# Patient Record
Sex: FEMALE | Race: WHITE | Employment: UNEMPLOYED | URBAN - METROPOLITAN AREA
[De-identification: names, ages, dates, MRNs, and addresses within clinical notes are randomized per-mention and may not be internally consistent; named-entity substitution may affect disease eponyms.]

---

## 2017-02-13 ENCOUNTER — LAB ENCOUNTER (OUTPATIENT)
Dept: LAB | Facility: HOSPITAL | Age: 47
End: 2017-02-13
Attending: FAMILY MEDICINE
Payer: COMMERCIAL

## 2017-02-13 DIAGNOSIS — E03.9 HYPOTHYROIDISM: ICD-10-CM

## 2017-02-13 DIAGNOSIS — R53.83 FATIGUE: Primary | ICD-10-CM

## 2017-02-13 LAB
ANTI-THYROGLOBULIN: 130 U/ML (ref ?–60)
ANTI-THYROPEROXIDASE: 584 U/ML (ref ?–60)
FREE T4: 0.7 NG/DL (ref 0.9–1.8)
HSCRP: 7.14 MG/L (ref ?–3)
T3FREE SERPL-MCNC: 2.39 PG/ML (ref 2.3–4.2)
TSI SER-ACNC: 1.99 MIU/ML (ref 0.35–5.5)

## 2017-02-13 PROCEDURE — 84439 ASSAY OF FREE THYROXINE: CPT

## 2017-02-13 PROCEDURE — 84443 ASSAY THYROID STIM HORMONE: CPT

## 2017-02-13 PROCEDURE — 36415 COLL VENOUS BLD VENIPUNCTURE: CPT

## 2017-02-13 PROCEDURE — 86376 MICROSOMAL ANTIBODY EACH: CPT

## 2017-02-13 PROCEDURE — 84481 FREE ASSAY (FT-3): CPT

## 2017-02-13 PROCEDURE — 86800 THYROGLOBULIN ANTIBODY: CPT

## 2017-02-13 PROCEDURE — 86141 C-REACTIVE PROTEIN HS: CPT

## 2017-06-17 ENCOUNTER — LAB ENCOUNTER (OUTPATIENT)
Dept: LAB | Facility: HOSPITAL | Age: 47
End: 2017-06-17
Attending: FAMILY MEDICINE
Payer: COMMERCIAL

## 2017-06-17 DIAGNOSIS — R53.83 FATIGUE: ICD-10-CM

## 2017-06-17 DIAGNOSIS — R53.81 MALAISE: ICD-10-CM

## 2017-06-17 DIAGNOSIS — E03.9 HYPOTHYROIDISM: Primary | ICD-10-CM

## 2017-06-17 PROCEDURE — 84481 FREE ASSAY (FT-3): CPT

## 2017-06-17 PROCEDURE — 80061 LIPID PANEL: CPT

## 2017-06-17 PROCEDURE — 84443 ASSAY THYROID STIM HORMONE: CPT

## 2017-06-17 PROCEDURE — 84439 ASSAY OF FREE THYROXINE: CPT

## 2017-06-17 PROCEDURE — 36415 COLL VENOUS BLD VENIPUNCTURE: CPT

## 2017-07-24 RX ORDER — LEVOCETIRIZINE DIHYDROCHLORIDE 5 MG/1
TABLET, FILM COATED ORAL
Qty: 30 TABLET | Refills: 0 | OUTPATIENT
Start: 2017-07-24

## 2017-07-24 NOTE — TELEPHONE ENCOUNTER
Request for refill of Xyzal. LOV was 12/22/2016 and med last ordered at that time for one year of refills. Pt due to see Dr in December 2017. Called pt and she does not want a refill at this time it was sent by automatic means by pharmacy.  Pt states she wi

## 2017-07-26 ENCOUNTER — HOSPITAL ENCOUNTER (OUTPATIENT)
Dept: MRI IMAGING | Facility: HOSPITAL | Age: 47
Discharge: HOME | End: 2017-07-26
Attending: OPHTHALMOLOGY
Payer: COMMERCIAL

## 2017-07-26 DIAGNOSIS — D49.89 ORBITAL TUMOR: ICD-10-CM

## 2017-07-26 PROCEDURE — 70543 MRI ORBT/FAC/NCK W/O &W/DYE: CPT | Performed by: OPHTHALMOLOGY

## 2017-07-26 PROCEDURE — A9575 INJ GADOTERATE MEGLUMI 0.1ML: HCPCS | Performed by: OPHTHALMOLOGY

## 2017-07-26 PROCEDURE — 70553 MRI BRAIN STEM W/O & W/DYE: CPT | Performed by: OPHTHALMOLOGY

## 2017-08-29 ENCOUNTER — LAB ENCOUNTER (OUTPATIENT)
Dept: LAB | Facility: HOSPITAL | Age: 47
End: 2017-08-29
Attending: FAMILY MEDICINE
Payer: COMMERCIAL

## 2017-08-29 DIAGNOSIS — G93.49 HASHIMOTO ENCEPHALOPATHY: ICD-10-CM

## 2017-08-29 DIAGNOSIS — D64.9 ANEMIA, UNSPECIFIED TYPE: ICD-10-CM

## 2017-08-29 DIAGNOSIS — E06.3 HASHIMOTO ENCEPHALOPATHY: ICD-10-CM

## 2017-08-29 DIAGNOSIS — E03.9 HYPOTHYROIDISM: ICD-10-CM

## 2017-08-29 DIAGNOSIS — R53.83 FATIGUE: ICD-10-CM

## 2017-08-29 DIAGNOSIS — E34.9 ENDOCRINE DISORDER: Primary | ICD-10-CM

## 2017-08-29 DIAGNOSIS — R53.81 MALAISE: ICD-10-CM

## 2017-08-29 LAB
25-HYDROXYVITAMIN D (TOTAL): 90.8 NG/ML (ref 30–100)
ANTI-THYROGLOBULIN: 174 U/ML (ref ?–60)
ANTI-THYROPEROXIDASE: 1113 U/ML (ref ?–60)
DEPRECATED HBV CORE AB SER IA-ACNC: 49.2 NG/ML (ref 10–291)
FREE T4: 0.5 NG/DL (ref 0.9–1.8)
HAV AB SERPL IA-ACNC: 686 PG/ML (ref 193–986)
HSCRP: 9.75 MG/L (ref ?–3)
T3 SERPL-MCNC: 77 NG/DL (ref 60–181)
T3FREE SERPL-MCNC: 2.41 PG/ML (ref 2.3–4.2)
TSI SER-ACNC: 3.04 MIU/ML (ref 0.35–5.5)

## 2017-08-29 PROCEDURE — 84443 ASSAY THYROID STIM HORMONE: CPT

## 2017-08-29 PROCEDURE — 82607 VITAMIN B-12: CPT

## 2017-08-29 PROCEDURE — 84439 ASSAY OF FREE THYROXINE: CPT

## 2017-08-29 PROCEDURE — 86800 THYROGLOBULIN ANTIBODY: CPT

## 2017-08-29 PROCEDURE — 84482 T3 REVERSE: CPT

## 2017-08-29 PROCEDURE — 82728 ASSAY OF FERRITIN: CPT

## 2017-08-29 PROCEDURE — 84481 FREE ASSAY (FT-3): CPT

## 2017-08-29 PROCEDURE — 86376 MICROSOMAL ANTIBODY EACH: CPT

## 2017-08-29 PROCEDURE — 86141 C-REACTIVE PROTEIN HS: CPT

## 2017-08-29 PROCEDURE — 82306 VITAMIN D 25 HYDROXY: CPT

## 2017-08-29 PROCEDURE — 84480 ASSAY TRIIODOTHYRONINE (T3): CPT

## 2017-08-29 PROCEDURE — 36415 COLL VENOUS BLD VENIPUNCTURE: CPT

## 2017-08-31 LAB — TRIIODOTHYRONINE, REVERSE: 4.6 NG/DL

## 2017-10-24 ENCOUNTER — HOSPITAL ENCOUNTER (OUTPATIENT)
Dept: MAMMOGRAPHY | Age: 47
Discharge: HOME | End: 2017-10-24
Attending: OBSTETRICS & GYNECOLOGY
Payer: COMMERCIAL

## 2017-10-24 DIAGNOSIS — Z12.31 VISIT FOR SCREENING MAMMOGRAM: ICD-10-CM

## 2017-10-24 PROCEDURE — 77067 SCR MAMMO BI INCL CAD: CPT | Performed by: OBSTETRICS & GYNECOLOGY

## 2017-11-02 ENCOUNTER — LAB ENCOUNTER (OUTPATIENT)
Dept: LAB | Age: 47
End: 2017-11-02
Attending: FAMILY MEDICINE
Payer: COMMERCIAL

## 2017-11-02 DIAGNOSIS — E06.3 CHRONIC LYMPHOCYTIC THYROIDITIS: ICD-10-CM

## 2017-11-02 DIAGNOSIS — I51.9 MYXEDEMA HEART DISEASE: Primary | ICD-10-CM

## 2017-11-02 DIAGNOSIS — E03.9 MYXEDEMA HEART DISEASE: Primary | ICD-10-CM

## 2017-11-02 DIAGNOSIS — R53.83 FATIGUE: ICD-10-CM

## 2017-11-02 PROCEDURE — 86376 MICROSOMAL ANTIBODY EACH: CPT

## 2017-11-02 PROCEDURE — 84481 FREE ASSAY (FT-3): CPT

## 2017-11-02 PROCEDURE — 82306 VITAMIN D 25 HYDROXY: CPT

## 2017-11-02 PROCEDURE — 84443 ASSAY THYROID STIM HORMONE: CPT

## 2017-11-02 PROCEDURE — 36415 COLL VENOUS BLD VENIPUNCTURE: CPT

## 2017-11-02 PROCEDURE — 84439 ASSAY OF FREE THYROXINE: CPT

## 2018-03-21 ENCOUNTER — LAB ENCOUNTER (OUTPATIENT)
Dept: LAB | Age: 48
End: 2018-03-21
Attending: FAMILY MEDICINE
Payer: COMMERCIAL

## 2018-03-21 DIAGNOSIS — E55.9 VITAMIN D DEFICIENCY: ICD-10-CM

## 2018-03-21 DIAGNOSIS — R53.83 FATIGUE: ICD-10-CM

## 2018-03-21 DIAGNOSIS — E03.9 HYPOTHYROIDISM: Primary | ICD-10-CM

## 2018-03-21 DIAGNOSIS — Z51.81 ENCOUNTER FOR THERAPEUTIC DRUG MONITORING: ICD-10-CM

## 2018-03-21 DIAGNOSIS — E06.3 HASHIMOTO'S THYROIDITIS: ICD-10-CM

## 2018-03-21 LAB
25-HYDROXYVITAMIN D (TOTAL): 42.8 NG/ML (ref 30–100)
ANTI-THYROGLOBULIN: 250 U/ML (ref ?–60)
ANTI-THYROPEROXIDASE: 1141 U/ML (ref ?–60)
C-REACTIVE PROTEIN: 0.57 MG/DL (ref ?–1)
DEPRECATED HBV CORE AB SER IA-ACNC: 48.3 NG/ML (ref 10–291)
FREE T4: 0.6 NG/DL (ref 0.9–1.8)
HAV AB SERPL IA-ACNC: 593 PG/ML (ref 193–986)
T3FREE SERPL-MCNC: 2.85 PG/ML (ref 2.3–4.2)
TSI SER-ACNC: 1.09 MIU/ML (ref 0.35–5.5)

## 2018-03-21 PROCEDURE — 86376 MICROSOMAL ANTIBODY EACH: CPT

## 2018-03-21 PROCEDURE — 86800 THYROGLOBULIN ANTIBODY: CPT

## 2018-03-21 PROCEDURE — 84439 ASSAY OF FREE THYROXINE: CPT

## 2018-03-21 PROCEDURE — 82306 VITAMIN D 25 HYDROXY: CPT

## 2018-03-21 PROCEDURE — 84443 ASSAY THYROID STIM HORMONE: CPT

## 2018-03-21 PROCEDURE — 36415 COLL VENOUS BLD VENIPUNCTURE: CPT

## 2018-03-21 PROCEDURE — 84481 FREE ASSAY (FT-3): CPT

## 2018-03-21 PROCEDURE — 86140 C-REACTIVE PROTEIN: CPT

## 2018-03-21 PROCEDURE — 82607 VITAMIN B-12: CPT

## 2018-03-21 PROCEDURE — 82728 ASSAY OF FERRITIN: CPT

## 2018-08-01 ENCOUNTER — LAB ENCOUNTER (OUTPATIENT)
Dept: LAB | Facility: HOSPITAL | Age: 48
End: 2018-08-01
Attending: FAMILY MEDICINE
Payer: COMMERCIAL

## 2018-08-01 DIAGNOSIS — E55.9 AVITAMINOSIS D: ICD-10-CM

## 2018-08-01 DIAGNOSIS — Z13.220 SCREENING FOR LIPOID DISORDERS: ICD-10-CM

## 2018-08-01 DIAGNOSIS — Z79.899 NEED FOR PROPHYLACTIC CHEMOTHERAPY: ICD-10-CM

## 2018-08-01 DIAGNOSIS — Z51.81 ENCOUNTER FOR THERAPEUTIC DRUG MONITORING: ICD-10-CM

## 2018-08-01 DIAGNOSIS — R53.83 FATIGUE: ICD-10-CM

## 2018-08-01 DIAGNOSIS — E06.3 CHRONIC LYMPHOCYTIC THYROIDITIS: ICD-10-CM

## 2018-08-01 DIAGNOSIS — E03.9 MYXEDEMA HEART DISEASE: Primary | ICD-10-CM

## 2018-08-01 DIAGNOSIS — I51.9 MYXEDEMA HEART DISEASE: Primary | ICD-10-CM

## 2018-08-01 DIAGNOSIS — Z13.89 ENCOUNTER FOR ROUTINE SCREENING FOR MALFORMATION USING ULTRASONICS: ICD-10-CM

## 2018-08-01 LAB
ALBUMIN SERPL-MCNC: 4.3 G/DL (ref 3.5–4.8)
ALBUMIN/GLOB SERPL: 1.2 {RATIO} (ref 1–2)
ALP LIVER SERPL-CCNC: 71 U/L (ref 39–100)
ALT SERPL-CCNC: 26 U/L (ref 14–54)
ANION GAP SERPL CALC-SCNC: 5 MMOL/L (ref 0–18)
AST SERPL-CCNC: 17 U/L (ref 15–41)
BILIRUB SERPL-MCNC: 0.6 MG/DL (ref 0.1–2)
BUN BLD-MCNC: 19 MG/DL (ref 8–20)
BUN/CREAT SERPL: 21.1 (ref 10–20)
CALCIUM BLD-MCNC: 9.4 MG/DL (ref 8.3–10.3)
CHLORIDE SERPL-SCNC: 108 MMOL/L (ref 101–111)
CHOLEST SMN-MCNC: 183 MG/DL (ref ?–200)
CO2 SERPL-SCNC: 29 MMOL/L (ref 22–32)
CREAT BLD-MCNC: 0.9 MG/DL (ref 0.55–1.02)
DEPRECATED HBV CORE AB SER IA-ACNC: 45.1 NG/ML (ref 12–240)
GLOBULIN PLAS-MCNC: 3.7 G/DL (ref 2.5–3.7)
GLUCOSE BLD-MCNC: 95 MG/DL (ref 70–99)
HAV AB SERPL IA-ACNC: 753 PG/ML (ref 193–986)
HDLC SERPL-MCNC: 56 MG/DL (ref 40–59)
HSCRP: 3.43 MG/L (ref ?–3)
LDLC SERPL CALC-MCNC: 113 MG/DL (ref ?–100)
M PROTEIN MFR SERPL ELPH: 8 G/DL (ref 6.1–8.3)
NONHDLC SERPL-MCNC: 127 MG/DL (ref ?–130)
OSMOLALITY SERPL CALC.SUM OF ELEC: 296 MOSM/KG (ref 275–295)
POTASSIUM SERPL-SCNC: 4.2 MMOL/L (ref 3.6–5.1)
SODIUM SERPL-SCNC: 142 MMOL/L (ref 136–144)
T3FREE SERPL-MCNC: 3.15 PG/ML (ref 2.3–4.2)
T4 FREE SERPL-MCNC: 0.6 NG/DL (ref 0.9–1.8)
THYROGLOB SERPL-MCNC: 213 U/ML (ref ?–60)
THYROPEROXIDASE AB SERPL-ACNC: 1653 U/ML (ref ?–60)
TRIGL SERPL-MCNC: 68 MG/DL (ref 30–149)
TSI SER-ACNC: 0.62 MIU/ML (ref 0.35–5.5)
VIT D+METAB SERPL-MCNC: 73.9 NG/ML (ref 30–100)
VLDLC SERPL CALC-MCNC: 14 MG/DL (ref 0–30)

## 2018-08-01 PROCEDURE — 82728 ASSAY OF FERRITIN: CPT

## 2018-08-01 PROCEDURE — 36415 COLL VENOUS BLD VENIPUNCTURE: CPT

## 2018-08-01 PROCEDURE — 82607 VITAMIN B-12: CPT

## 2018-08-01 PROCEDURE — 82306 VITAMIN D 25 HYDROXY: CPT

## 2018-08-01 PROCEDURE — 86141 C-REACTIVE PROTEIN HS: CPT

## 2018-08-01 PROCEDURE — 86800 THYROGLOBULIN ANTIBODY: CPT

## 2018-08-01 PROCEDURE — 80061 LIPID PANEL: CPT

## 2018-08-01 PROCEDURE — 84439 ASSAY OF FREE THYROXINE: CPT

## 2018-08-01 PROCEDURE — 80053 COMPREHEN METABOLIC PANEL: CPT

## 2018-08-01 PROCEDURE — 84443 ASSAY THYROID STIM HORMONE: CPT

## 2018-08-01 PROCEDURE — 86376 MICROSOMAL ANTIBODY EACH: CPT

## 2018-08-01 PROCEDURE — 84481 FREE ASSAY (FT-3): CPT

## 2018-10-10 ENCOUNTER — HOSPITAL ENCOUNTER (OUTPATIENT)
Dept: MAMMOGRAPHY | Facility: HOSPITAL | Age: 48
Discharge: HOME | End: 2018-10-10
Attending: NURSE PRACTITIONER
Payer: COMMERCIAL

## 2018-10-10 DIAGNOSIS — Z12.39 SCREENING FOR BREAST CANCER: ICD-10-CM

## 2018-10-10 PROCEDURE — 77067 SCR MAMMO BI INCL CAD: CPT | Performed by: NURSE PRACTITIONER

## 2018-10-10 PROCEDURE — 77063 BREAST TOMOSYNTHESIS BI: CPT | Performed by: NURSE PRACTITIONER

## 2018-10-12 PROBLEM — Z98.890 S/P ENDOMETRIAL ABLATION: Status: ACTIVE | Noted: 2018-10-12

## 2018-10-12 PROBLEM — R87.612 LOW GRADE SQUAMOUS INTRAEPITHELIAL LESION ON CYTOLOGIC SMEAR OF CERVIX (LGSIL): Status: ACTIVE | Noted: 2018-10-12

## 2018-10-15 ENCOUNTER — CHARTING TRANS (OUTPATIENT)
Dept: OTHER | Age: 48
End: 2018-10-15

## 2018-10-16 PROBLEM — N92.0 MENORRHAGIA WITH REGULAR CYCLE: Status: ACTIVE | Noted: 2018-10-16

## 2018-10-17 ENCOUNTER — CHARTING TRANS (OUTPATIENT)
Dept: OTHER | Age: 48
End: 2018-10-17

## 2018-10-22 ENCOUNTER — CHARTING TRANS (OUTPATIENT)
Dept: OTHER | Age: 48
End: 2018-10-22

## 2018-10-26 ENCOUNTER — CHARTING TRANS (OUTPATIENT)
Dept: OTHER | Age: 48
End: 2018-10-26

## 2018-10-29 ENCOUNTER — LAB SERVICES (OUTPATIENT)
Dept: OTHER | Age: 48
End: 2018-10-29

## 2018-10-29 ENCOUNTER — CHARTING TRANS (OUTPATIENT)
Dept: OTHER | Age: 48
End: 2018-10-29

## 2018-10-29 LAB — SKIN TEST, TB IN-OFFICE: NEGATIVE

## 2018-10-29 PROCEDURE — 88305 TISSUE EXAM BY PATHOLOGIST: CPT | Performed by: OBSTETRICS & GYNECOLOGY

## 2018-12-04 ENCOUNTER — LAB ENCOUNTER (OUTPATIENT)
Dept: LAB | Facility: HOSPITAL | Age: 48
End: 2018-12-04
Attending: FAMILY MEDICINE
Payer: COMMERCIAL

## 2018-12-04 DIAGNOSIS — E03.9 HYPOTHYROIDISM: Primary | ICD-10-CM

## 2018-12-04 DIAGNOSIS — R53.83 FATIGUE: ICD-10-CM

## 2018-12-04 DIAGNOSIS — R79.82 ELEVATED C-REACTIVE PROTEIN (CRP): ICD-10-CM

## 2018-12-04 DIAGNOSIS — E78.00 HYPERCHOLESTEROLEMIA: ICD-10-CM

## 2018-12-04 DIAGNOSIS — E06.3 HASHIMOTO'S THYROIDITIS: ICD-10-CM

## 2018-12-04 DIAGNOSIS — D64.9 ANEMIA: ICD-10-CM

## 2018-12-04 PROCEDURE — 84439 ASSAY OF FREE THYROXINE: CPT

## 2018-12-04 PROCEDURE — 86376 MICROSOMAL ANTIBODY EACH: CPT

## 2018-12-04 PROCEDURE — 84443 ASSAY THYROID STIM HORMONE: CPT

## 2018-12-04 PROCEDURE — 86141 C-REACTIVE PROTEIN HS: CPT

## 2018-12-04 PROCEDURE — 84481 FREE ASSAY (FT-3): CPT

## 2018-12-04 PROCEDURE — 36415 COLL VENOUS BLD VENIPUNCTURE: CPT

## 2018-12-04 PROCEDURE — 80061 LIPID PANEL: CPT

## 2018-12-04 PROCEDURE — 82728 ASSAY OF FERRITIN: CPT

## 2018-12-04 PROCEDURE — 84482 T3 REVERSE: CPT

## 2018-12-04 PROCEDURE — 80053 COMPREHEN METABOLIC PANEL: CPT

## 2019-02-25 RX ORDER — LEVOTHYROXINE SODIUM 25 UG/1
25 CAPSULE ORAL DAILY
COMMUNITY
End: 2019-11-26 | Stop reason: ALTCHOICE

## 2019-02-25 RX ORDER — OMEGA-3-ACID ETHYL ESTERS 1 G/1
1 CAPSULE, LIQUID FILLED ORAL DAILY
COMMUNITY
End: 2019-11-26 | Stop reason: ALTCHOICE

## 2019-02-27 ENCOUNTER — LABORATORY ENCOUNTER (OUTPATIENT)
Dept: LAB | Facility: HOSPITAL | Age: 49
End: 2019-02-27
Attending: OBSTETRICS & GYNECOLOGY
Payer: COMMERCIAL

## 2019-02-27 DIAGNOSIS — N92.0 MENORRHAGIA WITH REGULAR CYCLE: ICD-10-CM

## 2019-02-27 LAB
BASOPHILS # BLD AUTO: 0.04 X10(3) UL (ref 0–0.2)
BASOPHILS NFR BLD AUTO: 0.5 %
DEPRECATED RDW RBC AUTO: 41.1 FL (ref 35.1–46.3)
EOSINOPHIL # BLD AUTO: 0.07 X10(3) UL (ref 0–0.7)
EOSINOPHIL NFR BLD AUTO: 0.9 %
ERYTHROCYTE [DISTWIDTH] IN BLOOD BY AUTOMATED COUNT: 12.4 % (ref 11–15)
HCT VFR BLD AUTO: 42.9 % (ref 35–48)
HGB BLD-MCNC: 14.5 G/DL (ref 12–16)
IMM GRANULOCYTES # BLD AUTO: 0.02 X10(3) UL (ref 0–1)
IMM GRANULOCYTES NFR BLD: 0.3 %
LYMPHOCYTES # BLD AUTO: 2.1 X10(3) UL (ref 1–4)
LYMPHOCYTES NFR BLD AUTO: 28.1 %
MCH RBC QN AUTO: 30.2 PG (ref 26–34)
MCHC RBC AUTO-ENTMCNC: 33.8 G/DL (ref 31–37)
MCV RBC AUTO: 89.4 FL (ref 80–100)
MONOCYTES # BLD AUTO: 0.6 X10(3) UL (ref 0.1–1)
MONOCYTES NFR BLD AUTO: 8 %
NEUTROPHILS # BLD AUTO: 4.64 X10 (3) UL (ref 1.5–7.7)
NEUTROPHILS # BLD AUTO: 4.64 X10(3) UL (ref 1.5–7.7)
NEUTROPHILS NFR BLD AUTO: 62.2 %
PLATELET # BLD AUTO: 254 10(3)UL (ref 150–450)
RBC # BLD AUTO: 4.8 X10(6)UL (ref 3.8–5.3)
WBC # BLD AUTO: 7.5 X10(3) UL (ref 4–11)

## 2019-02-27 PROCEDURE — 36415 COLL VENOUS BLD VENIPUNCTURE: CPT

## 2019-02-27 PROCEDURE — 85025 COMPLETE CBC W/AUTO DIFF WBC: CPT

## 2019-03-06 ENCOUNTER — ANESTHESIA EVENT (OUTPATIENT)
Dept: SURGERY | Facility: HOSPITAL | Age: 49
End: 2019-03-06
Payer: COMMERCIAL
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
## 2019-03-07 ENCOUNTER — ANESTHESIA (OUTPATIENT)
Dept: SURGERY | Facility: HOSPITAL | Age: 49
End: 2019-03-07
Payer: COMMERCIAL

## 2019-03-07 ENCOUNTER — HOSPITAL ENCOUNTER (OUTPATIENT)
Facility: HOSPITAL | Age: 49
Discharge: HOME | End: 2019-03-08
Attending: OBSTETRICS & GYNECOLOGY | Admitting: OBSTETRICS & GYNECOLOGY
Payer: COMMERCIAL

## 2019-03-07 DIAGNOSIS — N92.0 MENORRHAGIA WITH REGULAR CYCLE: Primary | ICD-10-CM

## 2019-03-07 LAB
POCT LOT NUMBER: NORMAL
POCT URINE PREGNANCY: NEGATIVE

## 2019-03-07 PROCEDURE — 8E0W4CZ ROBOTIC ASSISTED PROCEDURE OF TRUNK REGION, PERCUTANEOUS ENDOSCOPIC APPROACH: ICD-10-PCS | Performed by: OBSTETRICS & GYNECOLOGY

## 2019-03-07 PROCEDURE — 88307 TISSUE EXAM BY PATHOLOGIST: CPT | Performed by: OBSTETRICS & GYNECOLOGY

## 2019-03-07 PROCEDURE — 0UT94ZZ RESECTION OF UTERUS, PERCUTANEOUS ENDOSCOPIC APPROACH: ICD-10-PCS | Performed by: OBSTETRICS & GYNECOLOGY

## 2019-03-07 PROCEDURE — 0UT24ZZ RESECTION OF BILATERAL OVARIES, PERCUTANEOUS ENDOSCOPIC APPROACH: ICD-10-PCS | Performed by: OBSTETRICS & GYNECOLOGY

## 2019-03-07 PROCEDURE — 0UT74ZZ RESECTION OF BILATERAL FALLOPIAN TUBES, PERCUTANEOUS ENDOSCOPIC APPROACH: ICD-10-PCS | Performed by: OBSTETRICS & GYNECOLOGY

## 2019-03-07 PROCEDURE — 81025 URINE PREGNANCY TEST: CPT | Performed by: OBSTETRICS & GYNECOLOGY

## 2019-03-07 DEVICE — INTERCEED: Type: IMPLANTABLE DEVICE | Site: ABDOMEN | Status: FUNCTIONAL

## 2019-03-07 RX ORDER — MEPERIDINE HYDROCHLORIDE 25 MG/ML
12.5 INJECTION INTRAMUSCULAR; INTRAVENOUS; SUBCUTANEOUS AS NEEDED
Status: COMPLETED | OUTPATIENT
Start: 2019-03-07 | End: 2019-03-07

## 2019-03-07 RX ORDER — DEXAMETHASONE SODIUM PHOSPHATE 4 MG/ML
4 VIAL (ML) INJECTION AS NEEDED
Status: DISCONTINUED | OUTPATIENT
Start: 2019-03-07 | End: 2019-03-07 | Stop reason: HOSPADM

## 2019-03-07 RX ORDER — DEXTROSE, SODIUM CHLORIDE, AND POTASSIUM CHLORIDE 5; .9; .15 G/100ML; G/100ML; G/100ML
INJECTION INTRAVENOUS CONTINUOUS
Status: DISCONTINUED | OUTPATIENT
Start: 2019-03-07 | End: 2019-03-08

## 2019-03-07 RX ORDER — SODIUM CHLORIDE, SODIUM LACTATE, POTASSIUM CHLORIDE, CALCIUM CHLORIDE 600; 310; 30; 20 MG/100ML; MG/100ML; MG/100ML; MG/100ML
INJECTION, SOLUTION INTRAVENOUS CONTINUOUS
Status: DISCONTINUED | OUTPATIENT
Start: 2019-03-07 | End: 2019-03-07 | Stop reason: HOSPADM

## 2019-03-07 RX ORDER — CEFAZOLIN SODIUM/WATER 2 G/20 ML
2 SYRINGE (ML) INTRAVENOUS ONCE
Status: COMPLETED | OUTPATIENT
Start: 2019-03-07 | End: 2019-03-07

## 2019-03-07 RX ORDER — HEPARIN SODIUM 5000 [USP'U]/ML
5000 INJECTION, SOLUTION INTRAVENOUS; SUBCUTANEOUS ONCE
Status: COMPLETED | OUTPATIENT
Start: 2019-03-07 | End: 2019-03-07

## 2019-03-07 RX ORDER — ESTRADIOL 0.04 MG/D
1 FILM, EXTENDED RELEASE TRANSDERMAL
COMMUNITY
End: 2019-03-21

## 2019-03-07 RX ORDER — LIOTHYRONINE SODIUM 5 UG/1
20 TABLET ORAL DAILY
COMMUNITY

## 2019-03-07 RX ORDER — HYDROCODONE BITARTRATE AND ACETAMINOPHEN 5; 325 MG/1; MG/1
1 TABLET ORAL EVERY 4 HOURS PRN
Status: DISCONTINUED | OUTPATIENT
Start: 2019-03-07 | End: 2019-03-08

## 2019-03-07 RX ORDER — ONDANSETRON 2 MG/ML
INJECTION INTRAMUSCULAR; INTRAVENOUS
Status: COMPLETED
Start: 2019-03-07 | End: 2019-03-07

## 2019-03-07 RX ORDER — ACETAMINOPHEN 500 MG
1000 TABLET ORAL ONCE
Status: DISCONTINUED | OUTPATIENT
Start: 2019-03-07 | End: 2019-03-07 | Stop reason: HOSPADM

## 2019-03-07 RX ORDER — ESTRADIOL 0.04 MG/D
1 FILM, EXTENDED RELEASE TRANSDERMAL
Status: DISCONTINUED | OUTPATIENT
Start: 2019-03-07 | End: 2019-03-08

## 2019-03-07 RX ORDER — BUPIVACAINE HYDROCHLORIDE 5 MG/ML
INJECTION, SOLUTION EPIDURAL; INTRACAUDAL AS NEEDED
Status: DISCONTINUED | OUTPATIENT
Start: 2019-03-07 | End: 2019-03-07

## 2019-03-07 RX ORDER — HYDROMORPHONE HYDROCHLORIDE 1 MG/ML
0.8 INJECTION, SOLUTION INTRAMUSCULAR; INTRAVENOUS; SUBCUTANEOUS EVERY 2 HOUR PRN
Status: DISCONTINUED | OUTPATIENT
Start: 2019-03-07 | End: 2019-03-08

## 2019-03-07 RX ORDER — METOCLOPRAMIDE HYDROCHLORIDE 5 MG/ML
10 INJECTION INTRAMUSCULAR; INTRAVENOUS EVERY 6 HOURS PRN
Status: DISCONTINUED | OUTPATIENT
Start: 2019-03-07 | End: 2019-03-08

## 2019-03-07 RX ORDER — KETOROLAC TROMETHAMINE 15 MG/ML
15 INJECTION, SOLUTION INTRAMUSCULAR; INTRAVENOUS EVERY 6 HOURS PRN
Status: DISCONTINUED | OUTPATIENT
Start: 2019-03-07 | End: 2019-03-08

## 2019-03-07 RX ORDER — HYDROMORPHONE HYDROCHLORIDE 1 MG/ML
0.4 INJECTION, SOLUTION INTRAMUSCULAR; INTRAVENOUS; SUBCUTANEOUS EVERY 5 MIN PRN
Status: DISCONTINUED | OUTPATIENT
Start: 2019-03-07 | End: 2019-03-07 | Stop reason: HOSPADM

## 2019-03-07 RX ORDER — HYDROCODONE BITARTRATE AND ACETAMINOPHEN 5; 325 MG/1; MG/1
2 TABLET ORAL EVERY 4 HOURS PRN
Status: DISCONTINUED | OUTPATIENT
Start: 2019-03-07 | End: 2019-03-08

## 2019-03-07 RX ORDER — SODIUM CHLORIDE, SODIUM LACTATE, POTASSIUM CHLORIDE, CALCIUM CHLORIDE 600; 310; 30; 20 MG/100ML; MG/100ML; MG/100ML; MG/100ML
INJECTION, SOLUTION INTRAVENOUS CONTINUOUS
Status: DISCONTINUED | OUTPATIENT
Start: 2019-03-07 | End: 2019-03-07

## 2019-03-07 RX ORDER — MEPERIDINE HYDROCHLORIDE 25 MG/ML
INJECTION INTRAMUSCULAR; INTRAVENOUS; SUBCUTANEOUS
Status: COMPLETED
Start: 2019-03-07 | End: 2019-03-07

## 2019-03-07 RX ORDER — DOCUSATE SODIUM 100 MG/1
100 CAPSULE, LIQUID FILLED ORAL 2 TIMES DAILY
Status: DISCONTINUED | OUTPATIENT
Start: 2019-03-07 | End: 2019-03-08

## 2019-03-07 RX ORDER — BISACODYL 10 MG
10 SUPPOSITORY, RECTAL RECTAL
Status: DISCONTINUED | OUTPATIENT
Start: 2019-03-07 | End: 2019-03-08

## 2019-03-07 RX ORDER — LEVOTHYROXINE SODIUM 0.03 MG/1
25 TABLET ORAL
Status: DISCONTINUED | OUTPATIENT
Start: 2019-03-07 | End: 2019-03-08

## 2019-03-07 RX ORDER — KETOROLAC TROMETHAMINE 30 MG/ML
30 INJECTION, SOLUTION INTRAMUSCULAR; INTRAVENOUS EVERY 6 HOURS PRN
Status: DISCONTINUED | OUTPATIENT
Start: 2019-03-07 | End: 2019-03-08

## 2019-03-07 RX ORDER — HYDROMORPHONE HYDROCHLORIDE 1 MG/ML
0.4 INJECTION, SOLUTION INTRAMUSCULAR; INTRAVENOUS; SUBCUTANEOUS EVERY 2 HOUR PRN
Status: DISCONTINUED | OUTPATIENT
Start: 2019-03-07 | End: 2019-03-08

## 2019-03-07 RX ORDER — CEFAZOLIN SODIUM/WATER 2 G/20 ML
SYRINGE (ML) INTRAVENOUS
Status: DISPENSED
Start: 2019-03-07 | End: 2019-03-07

## 2019-03-07 RX ORDER — SODIUM PHOSPHATE, DIBASIC AND SODIUM PHOSPHATE, MONOBASIC 7; 19 G/133ML; G/133ML
1 ENEMA RECTAL ONCE AS NEEDED
Status: DISCONTINUED | OUTPATIENT
Start: 2019-03-07 | End: 2019-03-08

## 2019-03-07 RX ORDER — HYDROMORPHONE HYDROCHLORIDE 1 MG/ML
1.2 INJECTION, SOLUTION INTRAMUSCULAR; INTRAVENOUS; SUBCUTANEOUS EVERY 2 HOUR PRN
Status: DISCONTINUED | OUTPATIENT
Start: 2019-03-07 | End: 2019-03-08

## 2019-03-07 RX ORDER — FLUTICASONE PROPIONATE 50 MCG
2 SPRAY, SUSPENSION (ML) NASAL DAILY PRN
Status: DISCONTINUED | OUTPATIENT
Start: 2019-03-07 | End: 2019-03-08

## 2019-03-07 RX ORDER — ONDANSETRON 2 MG/ML
4 INJECTION INTRAMUSCULAR; INTRAVENOUS EVERY 6 HOURS PRN
Status: DISCONTINUED | OUTPATIENT
Start: 2019-03-07 | End: 2019-03-08

## 2019-03-07 RX ORDER — NALOXONE HYDROCHLORIDE 0.4 MG/ML
80 INJECTION, SOLUTION INTRAMUSCULAR; INTRAVENOUS; SUBCUTANEOUS AS NEEDED
Status: DISCONTINUED | OUTPATIENT
Start: 2019-03-07 | End: 2019-03-07 | Stop reason: HOSPADM

## 2019-03-07 RX ORDER — IVERMECTIN 10 MG/G
CREAM TOPICAL
COMMUNITY
End: 2019-11-26

## 2019-03-07 RX ORDER — HEPARIN SODIUM 5000 [USP'U]/ML
INJECTION, SOLUTION INTRAVENOUS; SUBCUTANEOUS
Status: DISPENSED
Start: 2019-03-07 | End: 2019-03-07

## 2019-03-07 RX ORDER — ONDANSETRON 2 MG/ML
4 INJECTION INTRAMUSCULAR; INTRAVENOUS AS NEEDED
Status: DISCONTINUED | OUTPATIENT
Start: 2019-03-07 | End: 2019-03-07 | Stop reason: HOSPADM

## 2019-03-07 RX ORDER — POLYETHYLENE GLYCOL 3350 17 G/17G
17 POWDER, FOR SOLUTION ORAL DAILY PRN
Status: DISCONTINUED | OUTPATIENT
Start: 2019-03-07 | End: 2019-03-08

## 2019-03-07 NOTE — ANESTHESIA POSTPROCEDURE EVALUATION
[id number] Patient Status:  Outpatient in a Bed   Age/Gender 51year old female MRN [id number]   Location [address] [facility] Attending Kim Figueroa MD   Hosp Day # 0 PCP No primary care provider on file.        Anesthesi

## 2019-03-07 NOTE — ANESTHESIA PREPROCEDURE EVALUATION
PRE-OP EVALUATION    Patient Name: Anayeli Mead    Pre-op Diagnosis: Suyapa Grier    Procedure(s):  ROBOTIC TOTAL LAPAROSCOPIC HYSTERECTOMY, BILATERAL SALPINGO-OOPHORECTOMY    Surgeon(s) and Role:     Sandy Judge MD - Primary drop 2 (two) times daily. Disp:  Rfl:    Sodium Chloride, Hypertonic, (CHADD 128) 5 % Ophthalmic Ointment Place 1 drop into both eyes 2 (two) times daily. Disp:  Rfl:        Allergies: Erythromycin; Azithromycin;  Codeine      Anesthesia Evaluation    Patibrando guidelines. Post-procedure pain management plan discussed with surgeon and patient.       Plan/risks discussed with: patient and spouse                Present on Admission:  **None**

## 2019-03-07 NOTE — INTERVAL H&P NOTE
Reviewed procedure with Dr. Alma Rosa Abraham - all questions answered. Risks and Complications touched on again.   Plan RTLHBSO -

## 2019-03-07 NOTE — PROGRESS NOTES
Collected:  2/27/2019  9:10 AM Status:  Final result Dx:   Menorrhagia with regular cycle    Ref Range & Units 2/27/19  9:10 AM   WBC 4.0 - 11.0 x10(3) uL 7.5    RBC 3.80 - 5.30 x10(6)uL 4.80    HGB 12.0 - 16.0 g/dL 14.5    HCT 35.0 - 48.0 % 42.9

## 2019-03-07 NOTE — OPERATIVE REPORT
PREOPERATIVE DIAGNOSIS:  Fibroid Uterus           Menorrhagia    POSTOPERATIVE DIAGNOSIS:  Same              Endometriosis    PROCEDURE PERFORMED:   Robotic Total Laparoscopic Hysterectomy bilateral salpingo-oophorectomies       Cautery of Endometriosi was placed to dependent drainage using sterile technique. The patient was placed in only slight Trendelenburg, and a weighted speculum was introduced into the vagina after a pelvic exam was performed.   Right-angle retractor was used to aid in the visualiza The monopolar scissors were placed through the #1 arm, and the energy source was attached. The bipolar PK dissecting forceps were placed through the #2 arm, and the energy source was attached. We moved to the console for the procedure.     Systemic revie bites without drifting in the corners. The needle was cut and the suture was parked on the anterior abdominal wall. Hemostasis was excellent. Irrigation was then used and fluids were aspirated. Interceed was placed over the vaginal cuff.    A low pressur

## 2019-03-08 VITALS
RESPIRATION RATE: 18 BRPM | TEMPERATURE: 98 F | HEART RATE: 55 BPM | BODY MASS INDEX: 31.84 KG/M2 | DIASTOLIC BLOOD PRESSURE: 64 MMHG | OXYGEN SATURATION: 98 % | HEIGHT: 69 IN | SYSTOLIC BLOOD PRESSURE: 118 MMHG | WEIGHT: 214.94 LBS

## 2019-03-08 PROBLEM — N80.9 ENDOMETRIOSIS: Status: ACTIVE | Noted: 2019-03-08

## 2019-03-08 LAB
DEPRECATED RDW RBC AUTO: 42.1 FL (ref 35.1–46.3)
ERYTHROCYTE [DISTWIDTH] IN BLOOD BY AUTOMATED COUNT: 12.8 % (ref 11–15)
HCT VFR BLD AUTO: 37.7 % (ref 35–48)
HGB BLD-MCNC: 12.7 G/DL (ref 12–16)
MCH RBC QN AUTO: 30.2 PG (ref 26–34)
MCHC RBC AUTO-ENTMCNC: 33.7 G/DL (ref 31–37)
MCV RBC AUTO: 89.8 FL (ref 80–100)
PLATELET # BLD AUTO: 230 10(3)UL (ref 150–450)
RBC # BLD AUTO: 4.2 X10(6)UL (ref 3.8–5.3)
WBC # BLD AUTO: 9.4 X10(3) UL (ref 4–11)

## 2019-03-08 PROCEDURE — 85027 COMPLETE CBC AUTOMATED: CPT | Performed by: OBSTETRICS & GYNECOLOGY

## 2019-03-08 RX ORDER — HYDROCODONE BITARTRATE AND ACETAMINOPHEN 5; 325 MG/1; MG/1
2 TABLET ORAL EVERY 4 HOURS PRN
Qty: 25 TABLET | Refills: 0 | Status: SHIPPED | OUTPATIENT
Start: 2019-03-08 | End: 2019-03-21 | Stop reason: ALTCHOICE

## 2019-03-08 NOTE — DISCHARGE SUMMARY
[facility]  Discharge Summary    [facility] Patient Status:  Outpatient in a Bed    2/3/1970 MRN [id number]   [facility] 3NW-A Attending Darryl Mendoza MD   Hosp Day # 0 PCP No primary care provider on file.      Date of Adm ibuprofen 100 MG/5ML Oral Suspension  Take 200 mg by mouth every 4 (four) hours as needed for Fever. NATURE-THROID 32.5 MG Oral Tab  Take 0.5 tablets by mouth daily.     Refills: 1    Pantoprazole Sodium 40 MG Oral Tab EC  Take 1 tablet (40 mg total)

## 2019-03-08 NOTE — PROGRESS NOTES
[facility]  Progress Note    Yara Banegas Patient Status:  Outpatient in a Bed    2/3/1970 MRN [id number]   [facility] 3NW-A Attending Stacey Barnett MD   Hosp Day # 0 PCP No primary care provider on file.      Subjective:  PO

## 2019-03-21 PROBLEM — Z90.710 S/P LAPAROSCOPIC HYSTERECTOMY: Status: ACTIVE | Noted: 2019-03-21

## 2019-03-21 PROBLEM — N95.1 VASOMOTOR SYMPTOMS DUE TO MENOPAUSE: Status: ACTIVE | Noted: 2019-03-21

## 2019-06-12 ENCOUNTER — LAB ENCOUNTER (OUTPATIENT)
Dept: LAB | Age: 49
End: 2019-06-12
Attending: FAMILY MEDICINE
Payer: COMMERCIAL

## 2019-06-12 DIAGNOSIS — E55.9 VITAMIN D DEFICIENCY: ICD-10-CM

## 2019-06-12 DIAGNOSIS — E06.3 CHRONIC LYMPHOCYTIC THYROIDITIS: Primary | ICD-10-CM

## 2019-06-12 DIAGNOSIS — Z51.81 ENCOUNTER FOR THERAPEUTIC DRUG MONITORING: ICD-10-CM

## 2019-06-12 DIAGNOSIS — R79.82 ELEVATED C-REACTIVE PROTEIN (CRP): ICD-10-CM

## 2019-06-12 DIAGNOSIS — R53.83 FATIGUE: ICD-10-CM

## 2019-06-12 PROCEDURE — 82607 VITAMIN B-12: CPT

## 2019-06-12 PROCEDURE — 86376 MICROSOMAL ANTIBODY EACH: CPT

## 2019-06-12 PROCEDURE — 80053 COMPREHEN METABOLIC PANEL: CPT

## 2019-06-12 PROCEDURE — 80061 LIPID PANEL: CPT

## 2019-06-12 PROCEDURE — 85025 COMPLETE CBC W/AUTO DIFF WBC: CPT

## 2019-06-12 PROCEDURE — 84439 ASSAY OF FREE THYROXINE: CPT

## 2019-06-12 PROCEDURE — 86141 C-REACTIVE PROTEIN HS: CPT

## 2019-06-12 PROCEDURE — 36415 COLL VENOUS BLD VENIPUNCTURE: CPT

## 2019-06-12 PROCEDURE — 84481 FREE ASSAY (FT-3): CPT

## 2019-06-12 PROCEDURE — 82728 ASSAY OF FERRITIN: CPT

## 2019-06-12 PROCEDURE — 84443 ASSAY THYROID STIM HORMONE: CPT

## 2019-06-12 PROCEDURE — 82652 VIT D 1 25-DIHYDROXY: CPT

## 2019-10-24 ENCOUNTER — HOSPITAL ENCOUNTER (OUTPATIENT)
Dept: MAMMOGRAPHY | Age: 49
Discharge: HOME | End: 2019-10-24
Attending: NURSE PRACTITIONER
Payer: COMMERCIAL

## 2019-10-24 DIAGNOSIS — Z12.31 BREAST CANCER SCREENING BY MAMMOGRAM: ICD-10-CM

## 2019-10-24 PROCEDURE — 77067 SCR MAMMO BI INCL CAD: CPT | Performed by: NURSE PRACTITIONER

## 2019-10-24 PROCEDURE — 77063 BREAST TOMOSYNTHESIS BI: CPT | Performed by: NURSE PRACTITIONER

## 2019-11-06 ENCOUNTER — TELEPHONE (OUTPATIENT)
Dept: CT IMAGING | Facility: HOSPITAL | Age: 49
End: 2019-11-06

## 2019-11-06 ENCOUNTER — HOSPITAL ENCOUNTER (OUTPATIENT)
Dept: MAMMOGRAPHY | Age: 49
Discharge: HOME | End: 2019-11-06
Attending: NURSE PRACTITIONER
Payer: COMMERCIAL

## 2019-11-06 DIAGNOSIS — R92.2 INCONCLUSIVE MAMMOGRAM: ICD-10-CM

## 2019-11-06 PROCEDURE — 77066 DX MAMMO INCL CAD BI: CPT | Performed by: NURSE PRACTITIONER

## 2019-11-06 NOTE — TELEPHONE ENCOUNTER
This Breast Care RN phoned pt re bilateral stereo biopsy recommendation by Dr. Willa Santillan for calcifications. Procedure reviewed and all questions answered. Emotional support given. Confirmed pt did receive educational handouts.   On the day of the biopsy, nicky

## 2019-11-20 ENCOUNTER — LAB ENCOUNTER (OUTPATIENT)
Dept: LAB | Facility: HOSPITAL | Age: 49
End: 2019-11-20
Attending: FAMILY MEDICINE
Payer: COMMERCIAL

## 2019-11-20 ENCOUNTER — OFFICE VISIT (OUTPATIENT)
Dept: SURGERY | Facility: CLINIC | Age: 49
End: 2019-11-20
Payer: COMMERCIAL

## 2019-11-20 VITALS
DIASTOLIC BLOOD PRESSURE: 78 MMHG | WEIGHT: 160 LBS | HEART RATE: 66 BPM | BODY MASS INDEX: 23.7 KG/M2 | HEIGHT: 69 IN | SYSTOLIC BLOOD PRESSURE: 127 MMHG | TEMPERATURE: 98 F

## 2019-11-20 DIAGNOSIS — E78.00 PURE HYPERCHOLESTEROLEMIA: ICD-10-CM

## 2019-11-20 DIAGNOSIS — E06.3 CHRONIC LYMPHOCYTIC THYROIDITIS: Primary | ICD-10-CM

## 2019-11-20 DIAGNOSIS — Z51.81 ENCOUNTER FOR THERAPEUTIC DRUG MONITORING: ICD-10-CM

## 2019-11-20 DIAGNOSIS — R92.0 MICROCALCIFICATION OF BOTH BREASTS ON MAMMOGRAPHY: Primary | ICD-10-CM

## 2019-11-20 PROCEDURE — 36415 COLL VENOUS BLD VENIPUNCTURE: CPT

## 2019-11-20 PROCEDURE — 84481 FREE ASSAY (FT-3): CPT

## 2019-11-20 PROCEDURE — 99243 OFF/OP CNSLTJ NEW/EST LOW 30: CPT | Performed by: SURGERY

## 2019-11-20 PROCEDURE — 84443 ASSAY THYROID STIM HORMONE: CPT

## 2019-11-20 PROCEDURE — 84439 ASSAY OF FREE THYROXINE: CPT

## 2019-11-20 PROCEDURE — 80061 LIPID PANEL: CPT

## 2019-11-20 NOTE — PROGRESS NOTES
Follow Up Visit Note       Active Problems      1. Microcalcification of both breasts on mammography          Chief Complaint   Patient presents with:  Breast Follow-up: Pt here to follow up on mammogram done on 11/6.          History of Present Illness  Th • Nabothian cyst    • Ovarian cyst, right 10/09/2015    7 mm   • PONV (postoperative nausea and vomiting)    • Right hip pain     20y/o   • Spinal stenosis    • Visual impairment     dry eye     Past Surgical History:   Procedure Laterality Date   • ANKL History    Socioeconomic History      Marital status: Single      Spouse name: Not on file      Number of children: Not on file      Years of education: Not on file      Highest education level: Not on file    Occupational History      Occupation: occupati needed.  ), Disp: 180 tablet, Rfl: 0  •  Fluticasone Propionate 50 MCG/ACT Nasal Suspension, 2 sprays by Each Nare route daily.  (Patient taking differently: 2 sprays by Each Nare route daily as needed.  ), Disp: 1 Bottle, Rfl: 11  •  Cholecalciferol (VITAM LMP 02/07/2019   BMI 23.63 kg/m²   Physical Exam   Constitutional: She is oriented to person, place, and time. She appears well-developed and well-nourished. No distress. HENT:   Head: Normocephalic and atraumatic.    Eyes: Pupils are equal, round, and behavior is normal.   Nursing note and vitals reviewed. MAMMOGRAM   I personally viewed the films and agree with the radiologist's interpretation. The patient and I reviewed the images together as well.     FINDINGS:       Right breast:  There is a grou

## 2019-11-21 ENCOUNTER — HOSPITAL ENCOUNTER (OUTPATIENT)
Dept: MAMMOGRAPHY | Facility: HOSPITAL | Age: 49
Discharge: HOME | End: 2019-11-21
Attending: NURSE PRACTITIONER
Payer: COMMERCIAL

## 2019-11-21 DIAGNOSIS — R92.1 BREAST CALCIFICATIONS: ICD-10-CM

## 2019-11-21 PROCEDURE — 19081 BX BREAST 1ST LESION STRTCTC: CPT | Performed by: NURSE PRACTITIONER

## 2019-11-21 PROCEDURE — 88305 TISSUE EXAM BY PATHOLOGIST: CPT | Performed by: NURSE PRACTITIONER

## 2019-11-21 PROCEDURE — 19082 BX BREAST ADD LESION STRTCTC: CPT | Performed by: NURSE PRACTITIONER

## 2019-11-25 ENCOUNTER — TELEPHONE (OUTPATIENT)
Dept: SURGERY | Facility: CLINIC | Age: 49
End: 2019-11-25

## 2019-11-25 NOTE — TELEPHONE ENCOUNTER
I called the patient to discuss her pathology results from her stereotactic biopsies. Happily, the left breast biopsy site is benign. The right breast biopsy side shows Atypical Ductal Hyperplasia.   That is a lesion that needs to be excised in the OR to

## 2019-11-26 ENCOUNTER — TELEPHONE (OUTPATIENT)
Dept: SURGERY | Facility: CLINIC | Age: 49
End: 2019-11-26

## 2019-11-26 DIAGNOSIS — N60.91 ATYPICAL DUCTAL HYPERPLASIA OF RIGHT BREAST: Primary | ICD-10-CM

## 2019-11-26 RX ORDER — DIAZEPAM 5 MG/1
5 TABLET ORAL AS NEEDED
Status: CANCELLED | OUTPATIENT
Start: 2019-11-26

## 2019-11-26 RX ORDER — PYRIDOXINE HCL (VITAMIN B6) 50 MG
1 TABLET ORAL DAILY
COMMUNITY
End: 2021-07-08

## 2019-11-26 RX ORDER — AMOXICILLIN 250 MG
1 CAPSULE ORAL DAILY
COMMUNITY
End: 2021-07-08

## 2019-11-26 RX ORDER — LEVOTHYROXINE SODIUM 25 UG/1
1 CAPSULE ORAL DAILY
COMMUNITY

## 2019-11-26 RX ORDER — CHLORAL HYDRATE 500 MG
1000 CAPSULE ORAL DAILY
COMMUNITY

## 2019-11-26 RX ORDER — METHION/INOS/CHOL BT/B COM/LIV 110MG-86MG
1 CAPSULE ORAL DAILY
COMMUNITY
End: 2021-07-08

## 2019-11-26 NOTE — TELEPHONE ENCOUNTER
Went over surgical checklist for procedure on 12/5. Patient had a few more questions regarding x-ray loc., susi  etc..  Answered questions to the best of my knowledge, and did offer her a pre-op appointment for additional details, in which she was appr

## 2019-11-27 ENCOUNTER — TELEPHONE (OUTPATIENT)
Dept: SURGERY | Facility: CLINIC | Age: 49
End: 2019-11-27

## 2019-12-02 ENCOUNTER — TELEPHONE (OUTPATIENT)
Dept: SURGERY | Facility: CLINIC | Age: 49
End: 2019-12-02

## 2019-12-02 DIAGNOSIS — N60.91 ATYPICAL DUCTAL HYPERPLASIA OF RIGHT BREAST: Primary | ICD-10-CM

## 2019-12-03 ENCOUNTER — OFFICE VISIT (OUTPATIENT)
Dept: HEMATOLOGY/ONCOLOGY | Facility: HOSPITAL | Age: 49
End: 2019-12-03
Attending: SURGERY
Payer: COMMERCIAL

## 2019-12-03 ENCOUNTER — OFFICE VISIT (OUTPATIENT)
Dept: SURGERY | Facility: CLINIC | Age: 49
End: 2019-12-03
Payer: COMMERCIAL

## 2019-12-03 VITALS
SYSTOLIC BLOOD PRESSURE: 124 MMHG | OXYGEN SATURATION: 98 % | WEIGHT: 166 LBS | RESPIRATION RATE: 16 BRPM | HEART RATE: 63 BPM | DIASTOLIC BLOOD PRESSURE: 72 MMHG | HEIGHT: 69 IN | BODY MASS INDEX: 24.59 KG/M2 | TEMPERATURE: 98 F

## 2019-12-03 DIAGNOSIS — N60.01 CYST OF RIGHT BREAST: ICD-10-CM

## 2019-12-03 DIAGNOSIS — N60.91 ATYPICAL DUCTAL HYPERPLASIA OF RIGHT BREAST: Primary | ICD-10-CM

## 2019-12-03 PROCEDURE — 99211 OFF/OP EST MAY X REQ PHY/QHP: CPT

## 2019-12-03 PROCEDURE — 99213 OFFICE O/P EST LOW 20 MIN: CPT | Performed by: SURGERY

## 2019-12-03 NOTE — PROGRESS NOTES
HPI:    Patient ID: Eddie Wallace is a 51year old female who underwent bilateral stereotactic breast biopsy in November 21, 2019. The left breast biopsy was benign. The right breast biopsy demonstrated atypical ductal hyperplasia.   The patient is sche MAGNESIUM OR Take by mouth See Admin Instructions. Take 3-5 tablets by mouth nightly. 30 capsule 0   • Sodium Chloride, Hypertonic, (CHADD 128) 5 % Ophthalmic Ointment Place 1 drop into both eyes 2 (two) times daily.        Allergies:  Erythromycin breast cancer in her lifetime. · The first step, would be excision. This would rule out any upstaging of her disease. · If she does have stage 0 or I cancer, she may require further therapy, such as radiation therapy or a sentinel lymph node biopsy.   Sh

## 2019-12-03 NOTE — PROGRESS NOTES
Patient is here today for follow up  with Dr. Ania Banegas / Smith Lin. Patient Denies pain. Medication list and medical history were reviewed and updated.     Education Record    Learner:  Patient    Disease / Diagnosis: Breast Clinic / Dr. Kiesha Obrien

## 2019-12-06 ENCOUNTER — LAB ENCOUNTER (OUTPATIENT)
Dept: LAB | Age: 49
End: 2019-12-06
Attending: FAMILY MEDICINE
Payer: COMMERCIAL

## 2019-12-06 DIAGNOSIS — R53.83 FATIGUE: ICD-10-CM

## 2019-12-06 DIAGNOSIS — E55.9 VITAMIN D DEFICIENCY: ICD-10-CM

## 2019-12-06 DIAGNOSIS — R79.82 CRP ELEVATED: ICD-10-CM

## 2019-12-06 DIAGNOSIS — E03.9 HYPOTHYROIDISM: Primary | ICD-10-CM

## 2019-12-06 DIAGNOSIS — E06.3 HASHIMOTO'S THYROIDITIS: ICD-10-CM

## 2019-12-06 DIAGNOSIS — E53.8 B12 DEFICIENCY: ICD-10-CM

## 2019-12-06 PROCEDURE — 36415 COLL VENOUS BLD VENIPUNCTURE: CPT

## 2019-12-06 PROCEDURE — 84482 T3 REVERSE: CPT

## 2019-12-06 PROCEDURE — 82728 ASSAY OF FERRITIN: CPT

## 2019-12-06 PROCEDURE — 84481 FREE ASSAY (FT-3): CPT

## 2019-12-06 PROCEDURE — 86141 C-REACTIVE PROTEIN HS: CPT

## 2019-12-06 PROCEDURE — 82306 VITAMIN D 25 HYDROXY: CPT

## 2019-12-06 PROCEDURE — 84443 ASSAY THYROID STIM HORMONE: CPT

## 2019-12-06 PROCEDURE — 82607 VITAMIN B-12: CPT

## 2019-12-06 PROCEDURE — 86376 MICROSOMAL ANTIBODY EACH: CPT

## 2019-12-06 PROCEDURE — 84439 ASSAY OF FREE THYROXINE: CPT

## 2019-12-06 PROCEDURE — 80061 LIPID PANEL: CPT

## 2019-12-09 ENCOUNTER — TELEPHONE (OUTPATIENT)
Dept: CT IMAGING | Facility: HOSPITAL | Age: 49
End: 2019-12-09

## 2019-12-09 ENCOUNTER — HOSPITAL ENCOUNTER (OUTPATIENT)
Dept: MAMMOGRAPHY | Facility: HOSPITAL | Age: 49
Discharge: HOME | End: 2019-12-09
Attending: SURGERY
Payer: COMMERCIAL

## 2019-12-09 DIAGNOSIS — N60.91 ATYPICAL DUCTAL HYPERPLASIA OF RIGHT BREAST: ICD-10-CM

## 2019-12-09 PROCEDURE — 19281 PERQ DEVICE BREAST 1ST IMAG: CPT | Performed by: SURGERY

## 2019-12-09 NOTE — TELEPHONE ENCOUNTER
Phoned Trip Thorpe regarding susi or needle localization process of breast for lumpectomy. Several calls made and pt is now scheduled for susi seed loc today at 1100 with surgery tomorrow with Dr. Tj Sweeney.   Procedure explained and all questions answered

## 2019-12-09 NOTE — IMAGING NOTE
Pt here for BEENA  reflector placement to localize breast prior to surgery on 12/10. Procedure explained and emotional support provided. Pt tolerated well with minimal discomfort or bleeding.  Reflector activated and band aid placed over insertion site

## 2019-12-10 ENCOUNTER — HOSPITAL ENCOUNTER (OUTPATIENT)
Dept: MAMMOGRAPHY | Facility: HOSPITAL | Age: 49
Discharge: HOME | End: 2019-12-10
Attending: SURGERY
Payer: COMMERCIAL

## 2019-12-10 ENCOUNTER — ANESTHESIA (OUTPATIENT)
Dept: SURGERY | Facility: HOSPITAL | Age: 49
End: 2019-12-10
Payer: COMMERCIAL

## 2019-12-10 ENCOUNTER — HOSPITAL ENCOUNTER (OUTPATIENT)
Facility: HOSPITAL | Age: 49
Setting detail: HOSPITAL OUTPATIENT SURGERY
Discharge: HOME | End: 2019-12-10
Attending: SURGERY | Admitting: SURGERY
Payer: COMMERCIAL

## 2019-12-10 ENCOUNTER — ANESTHESIA EVENT (OUTPATIENT)
Dept: SURGERY | Facility: HOSPITAL | Age: 49
End: 2019-12-10
Payer: COMMERCIAL

## 2019-12-10 VITALS
WEIGHT: 159.19 LBS | TEMPERATURE: 98 F | SYSTOLIC BLOOD PRESSURE: 98 MMHG | DIASTOLIC BLOOD PRESSURE: 77 MMHG | HEART RATE: 68 BPM | HEIGHT: 69 IN | RESPIRATION RATE: 18 BRPM | BODY MASS INDEX: 23.58 KG/M2 | OXYGEN SATURATION: 98 %

## 2019-12-10 DIAGNOSIS — N60.91 ATYPICAL DUCTAL HYPERPLASIA OF RIGHT BREAST: ICD-10-CM

## 2019-12-10 PROCEDURE — 76098 X-RAY EXAM SURGICAL SPECIMEN: CPT | Performed by: SURGERY

## 2019-12-10 PROCEDURE — 88305 TISSUE EXAM BY PATHOLOGIST: CPT | Performed by: SURGERY

## 2019-12-10 PROCEDURE — 76937 US GUIDE VASCULAR ACCESS: CPT | Performed by: SURGERY

## 2019-12-10 PROCEDURE — 0HBT0ZZ EXCISION OF RIGHT BREAST, OPEN APPROACH: ICD-10-PCS | Performed by: SURGERY

## 2019-12-10 PROCEDURE — 88307 TISSUE EXAM BY PATHOLOGIST: CPT | Performed by: SURGERY

## 2019-12-10 PROCEDURE — 36410 VNPNXR 3YR/> PHY/QHP DX/THER: CPT | Performed by: SURGERY

## 2019-12-10 RX ORDER — ACETAMINOPHEN 500 MG
1000 TABLET ORAL ONCE AS NEEDED
Status: COMPLETED | OUTPATIENT
Start: 2019-12-10 | End: 2019-12-10

## 2019-12-10 RX ORDER — LABETALOL HYDROCHLORIDE 5 MG/ML
5 INJECTION, SOLUTION INTRAVENOUS EVERY 5 MIN PRN
Status: DISCONTINUED | OUTPATIENT
Start: 2019-12-10 | End: 2019-12-10

## 2019-12-10 RX ORDER — LIDOCAINE HYDROCHLORIDE 10 MG/ML
INJECTION, SOLUTION INFILTRATION; PERINEURAL AS NEEDED
Status: DISCONTINUED | OUTPATIENT
Start: 2019-12-10 | End: 2019-12-10

## 2019-12-10 RX ORDER — DIPHENHYDRAMINE HYDROCHLORIDE 50 MG/ML
12.5 INJECTION INTRAMUSCULAR; INTRAVENOUS AS NEEDED
Status: DISCONTINUED | OUTPATIENT
Start: 2019-12-10 | End: 2019-12-10

## 2019-12-10 RX ORDER — DEXAMETHASONE SODIUM PHOSPHATE 4 MG/ML
4 VIAL (ML) INJECTION AS NEEDED
Status: DISCONTINUED | OUTPATIENT
Start: 2019-12-10 | End: 2019-12-10

## 2019-12-10 RX ORDER — ACETAMINOPHEN 500 MG
1000 TABLET ORAL ONCE
COMMUNITY
End: 2021-07-08

## 2019-12-10 RX ORDER — ACETAMINOPHEN 500 MG
TABLET ORAL
Status: DISCONTINUED
Start: 2019-12-10 | End: 2019-12-10

## 2019-12-10 RX ORDER — LIDOCAINE HYDROCHLORIDE 10 MG/ML
INJECTION, SOLUTION EPIDURAL; INFILTRATION; INTRACAUDAL; PERINEURAL AS NEEDED
Status: DISCONTINUED | OUTPATIENT
Start: 2019-12-10 | End: 2019-12-10 | Stop reason: SURG

## 2019-12-10 RX ORDER — MIDAZOLAM HYDROCHLORIDE 1 MG/ML
1 INJECTION INTRAMUSCULAR; INTRAVENOUS EVERY 5 MIN PRN
Status: DISCONTINUED | OUTPATIENT
Start: 2019-12-10 | End: 2019-12-10

## 2019-12-10 RX ORDER — SODIUM CHLORIDE, SODIUM LACTATE, POTASSIUM CHLORIDE, CALCIUM CHLORIDE 600; 310; 30; 20 MG/100ML; MG/100ML; MG/100ML; MG/100ML
INJECTION, SOLUTION INTRAVENOUS CONTINUOUS
Status: DISCONTINUED | OUTPATIENT
Start: 2019-12-10 | End: 2019-12-10

## 2019-12-10 RX ORDER — NALOXONE HYDROCHLORIDE 0.4 MG/ML
80 INJECTION, SOLUTION INTRAMUSCULAR; INTRAVENOUS; SUBCUTANEOUS AS NEEDED
Status: DISCONTINUED | OUTPATIENT
Start: 2019-12-10 | End: 2019-12-10

## 2019-12-10 RX ORDER — CEFAZOLIN SODIUM/WATER 2 G/20 ML
2 SYRINGE (ML) INTRAVENOUS ONCE
Status: COMPLETED | OUTPATIENT
Start: 2019-12-10 | End: 2019-12-10

## 2019-12-10 RX ORDER — ACETAMINOPHEN 500 MG
1000 TABLET ORAL ONCE
Status: DISCONTINUED | OUTPATIENT
Start: 2019-12-10 | End: 2019-12-10

## 2019-12-10 RX ORDER — TRAMADOL HYDROCHLORIDE 50 MG/1
50 TABLET ORAL AS NEEDED
Qty: 15 TABLET | Refills: 0 | Status: SHIPPED | OUTPATIENT
Start: 2019-12-10 | End: 2020-01-14

## 2019-12-10 RX ORDER — BUPIVACAINE HYDROCHLORIDE AND EPINEPHRINE 5; 5 MG/ML; UG/ML
INJECTION, SOLUTION EPIDURAL; INTRACAUDAL; PERINEURAL AS NEEDED
Status: DISCONTINUED | OUTPATIENT
Start: 2019-12-10 | End: 2019-12-10

## 2019-12-10 RX ORDER — METOCLOPRAMIDE HYDROCHLORIDE 5 MG/ML
10 INJECTION INTRAMUSCULAR; INTRAVENOUS AS NEEDED
Status: DISCONTINUED | OUTPATIENT
Start: 2019-12-10 | End: 2019-12-10

## 2019-12-10 RX ORDER — ONDANSETRON 2 MG/ML
4 INJECTION INTRAMUSCULAR; INTRAVENOUS AS NEEDED
Status: DISCONTINUED | OUTPATIENT
Start: 2019-12-10 | End: 2019-12-10

## 2019-12-10 RX ADMIN — LIDOCAINE HYDROCHLORIDE 50 MG: 10 INJECTION, SOLUTION EPIDURAL; INFILTRATION; INTRACAUDAL; PERINEURAL at 16:20:00

## 2019-12-10 RX ADMIN — SODIUM CHLORIDE, SODIUM LACTATE, POTASSIUM CHLORIDE, CALCIUM CHLORIDE: 600; 310; 30; 20 INJECTION, SOLUTION INTRAVENOUS at 17:22:00

## 2019-12-10 RX ADMIN — CEFAZOLIN SODIUM/WATER 2 G: 2 G/20 ML SYRINGE (ML) INTRAVENOUS at 16:24:00

## 2019-12-10 NOTE — ANESTHESIA PREPROCEDURE EVALUATION
PRE-OP EVALUATION    Patient Name: Barbara Mattson    Pre-op Diagnosis: Atypical ductal hyperplasia of right breast [N60.91]    Procedure(s):  RIGHT BREAST LUMPECTOMY WITH XRAY LOCALIZATION AND BEENA SEED    Surgeon(s) and Role:     CAESAR Carson ROS.                          Patient Active Problem List:     Other acne     Fatigue     Vitamin D deficiency     Hypothyroidism     GERD (gastroesophageal reflux disease)     Allergic rhinitis     Dry eyes     History of benign eye tumor     Cyst of righ patient. Comment: Risks and benefits of MAC/GA explained including but not limited to aspiration, mouth/dental/airway injury, PONV explained. Understanding expressed as well as a wish to proceed.     Plan/risks discussed with: patient and Other

## 2019-12-10 NOTE — OPERATIVE REPORT
DATE OF OPERATION:  12/10/2019    PREOPERATIVE DIAGNOSIS: Right breast atypical ductal hyperplasia    POSTOPERATIVE DIAGNOSIS: Right breast atypical ductal hyperplasia    PROCEDURE PERFORMED: X-ray localized right breast lumpectomy.      SURGEON:  Robbie Farris found to be still within the breast.  Further dissection continued until the tissue that surrounded the BEENA  was found. This tissue, which was posterior to the original tissue, was grasped and excised using the scalpel.   Both specimen were marked lo

## 2019-12-10 NOTE — H&P
History & Physical Examination    Patient Name: Edilberto Cartagena  MRN: [id number]  CSN: [id number]  YOB: 1970    Diagnosis: right breast atypical ductal hyperplasia    Present Illness: Edilberto Cartagena is a 51year old female who underwent bilate daily., Disp: , Rfl: , More than a month at Unknown time  Cyanocobalamin (B-12) 100 MCG Oral Tab, Take 1 tablet by mouth daily. , Disp: , Rfl: , More than a month at Unknown time  ibuprofen 100 MG/5ML Oral Suspension, Take 200 mg by mouth every 4 (four) christie • ENDOMETRIAL ABLATION     • HYSTERECTOMY  03/2019   • HYSTEROSCOPY ENDOMETRIAL ABLATION N/A 4/22/2016    Performed by Josh Okeefe MD at [address] [facility] [address]   • MARY BIOPSY STEREO NODULE 1 SITE LEFT (CPT=19081)  2010    BENIGN   • MARY BIOPSY STEREO NODULE OTHER   R breast ADH     [ x ] I have discussed the risks and benefits and alternatives with the patient/family. They understand and agree to proceed with plan of care. [ x ] I have reviewed the History and Physical done within the last 30 days.   Any c

## 2019-12-10 NOTE — ANESTHESIA POSTPROCEDURE EVALUATION
[id number], Dr Patient Status:  Hospital Outpatient Surgery   Age/Gender 51year old female MRN [id number]   [facility] SURGERY Attending Radha Cevallos MD   Hosp Day # 0 PCP No primary care provider on file.        An

## 2019-12-11 ENCOUNTER — TELEPHONE (OUTPATIENT)
Dept: SURGERY | Facility: CLINIC | Age: 49
End: 2019-12-11

## 2019-12-11 NOTE — TELEPHONE ENCOUNTER
I called the patient to let her know her pathology shows foci of atypical ductal hyperplasia. There was no upstaging of her disease. She will not need any further surgical therapy. I will see her back for her postoperative evaluation.   At that time, I mimi

## 2019-12-23 ENCOUNTER — OFFICE VISIT (OUTPATIENT)
Dept: SURGERY | Facility: CLINIC | Age: 49
End: 2019-12-23

## 2019-12-23 VITALS
HEART RATE: 71 BPM | DIASTOLIC BLOOD PRESSURE: 65 MMHG | SYSTOLIC BLOOD PRESSURE: 100 MMHG | BODY MASS INDEX: 23 KG/M2 | WEIGHT: 159 LBS | TEMPERATURE: 98 F

## 2019-12-23 DIAGNOSIS — N60.91 ATYPICAL DUCTAL HYPERPLASIA OF RIGHT BREAST: Primary | ICD-10-CM

## 2019-12-23 DIAGNOSIS — R92.0 MICROCALCIFICATION OF LEFT BREAST ON MAMMOGRAM: ICD-10-CM

## 2019-12-23 PROCEDURE — 99024 POSTOP FOLLOW-UP VISIT: CPT | Performed by: SURGERY

## 2019-12-23 NOTE — PROGRESS NOTES
Post Operative Visit Note       Active Problems  1. Atypical ductal hyperplasia of right breast    2. Microcalcification of left breast on mammogram         Chief Complaint   Patient presents with:  Post-Op: p/o X-ray localized right breast lumpectomy. HYSTEROSCOPY ENDOMETRIAL ABLATION N/A 4/22/2016    Performed by Ximena Jordan MD at [facility] MAIN OR   • MARY BIOPSY STEREO NODULE 1 SITE LEFT (CPT=19081)  2010    BENIGN   • MARY BIOPSY STEREO NODULE 2 SITE BILAT (CPT=19081/60974) Left 2010    benign   • OT Smokeless tobacco: Never Used    Substance and Sexual Activity      Alcohol use: No        Frequency: Never      Drug use: No      Sexual activity: Yes        Partners: Male        Birth control/protection: Hysterectomy    Other Topics      Concerns: (COQ-10 OR), Take 1 tablet by mouth daily. , Disp: 30 capsule, Rfl: 0  •  MAGNESIUM OR, Take by mouth See Admin Instructions.  Take 3-5 tablets by mouth nightly. , Disp: 30 capsule, Rfl: 0  •  Sodium Chloride, Hypertonic, (CHADD 128) 5 % Ophthalmic Ointment, no skin change and no tenderness. Right breast incision healing well without erythema, fluctuance, or induration. There is very faint ecchymosis around the incision. Neurological: She is alert and oriented to person, place, and time.    Skin: Skin

## 2020-01-13 NOTE — PROGRESS NOTES
Cancer Center Report of Consultation    Patient Name: Natalie Mcarthur   YOB: 1970   Medical Record Number: [id number]   CSN: [id number]   Consulting Physician: Flora Iniguez MD  Referring Physician(s): Jaky Prakash MD  Date of Consultati Herb Julien MD at [facility] MAIN OR   • MARY BIOPSY STEREO NODULE 1 SITE LEFT (CPT=19081)  2010    BENIGN   • MARY BIOPSY STEREO NODULE 2 SITE BILAT (IOR=05605/47369) Left 2010    benign   • OTHER SURGICAL HISTORY  2006    septoplasty   • ROBOT-ASSISTED LAPAROSCOPIC H NAUSEA ONLY    Current Medications:    Current Outpatient Medications:   •  acetaminophen 500 MG Oral Tab, Take 1,000 mg by mouth one time. , Disp: , Rfl:   •  traMADol HCl 50 MG Oral Tab, Take 1 tablet (50 mg total) by mouth as needed for Pain.  1 tablet by Disp: , Rfl:     Review of Systems:    Constitutional: No chills, fevers, malaise, night sweats and weight loss. Eyes: No visual disturbance, irritation and redness.   Ears, nose, mouth, throat, and face: No hearing loss, tinnitus, hoarseness and voice ch management including surveillance and risk reduction with endocrine therapy. She should continue with regular mammograms and breast exams as discussed.  We also discussed chemoprevention with aromatase inhibitors as she is postmenopausal.  She has stopped h

## 2020-01-14 ENCOUNTER — OFFICE VISIT (OUTPATIENT)
Dept: HEMATOLOGY/ONCOLOGY | Facility: HOSPITAL | Age: 50
End: 2020-01-14
Attending: SURGERY
Payer: COMMERCIAL

## 2020-01-14 VITALS
OXYGEN SATURATION: 96 % | SYSTOLIC BLOOD PRESSURE: 129 MMHG | TEMPERATURE: 98 F | HEIGHT: 68.86 IN | RESPIRATION RATE: 18 BRPM | WEIGHT: 162 LBS | HEART RATE: 74 BPM | BODY MASS INDEX: 23.99 KG/M2 | DIASTOLIC BLOOD PRESSURE: 75 MMHG

## 2020-01-14 DIAGNOSIS — Z78.0 POSTMENOPAUSAL: ICD-10-CM

## 2020-01-14 DIAGNOSIS — N60.99 ATYPICAL DUCTAL HYPERPLASIA, BREAST: Primary | ICD-10-CM

## 2020-01-14 PROCEDURE — 99244 OFF/OP CNSLTJ NEW/EST MOD 40: CPT | Performed by: INTERNAL MEDICINE

## 2020-01-14 NOTE — PROGRESS NOTES
MD consult for RB ADH. S/p lumpectomy with Dr. Campos Nelson. Here to discuss POC.      Education Record    Learner:  Patient, partner    Barriers / Limitations:  None   Comments:    Method:  Discussion   Comments:    General Topics:  Plan of care reviewed   Comme

## 2020-01-14 NOTE — PATIENT INSTRUCTIONS
Please call [phone] ([phone]) with any questions or concerns Monday through Friday 8:00 to 4:30.     For after hours or weekends/holidays for emergent needs, [phone] will reach the on-call MD.

## 2020-01-17 ENCOUNTER — HOSPITAL ENCOUNTER (OUTPATIENT)
Dept: BONE DENSITY | Age: 50
Discharge: HOME | End: 2020-01-17
Attending: INTERNAL MEDICINE
Payer: COMMERCIAL

## 2020-01-17 DIAGNOSIS — Z78.0 POSTMENOPAUSAL: ICD-10-CM

## 2020-01-17 PROCEDURE — 77080 DXA BONE DENSITY AXIAL: CPT | Performed by: INTERNAL MEDICINE

## 2020-01-28 ENCOUNTER — APPOINTMENT (OUTPATIENT)
Dept: GENETICS | Facility: HOSPITAL | Age: 50
End: 2020-01-28
Attending: SURGERY
Payer: COMMERCIAL

## 2020-06-01 ENCOUNTER — HOSPITAL ENCOUNTER (OUTPATIENT)
Dept: MAMMOGRAPHY | Facility: HOSPITAL | Age: 50
Discharge: HOME | End: 2020-06-01
Attending: SURGERY
Payer: COMMERCIAL

## 2020-06-01 DIAGNOSIS — N60.91 ATYPICAL DUCTAL HYPERPLASIA OF RIGHT BREAST: ICD-10-CM

## 2020-06-01 DIAGNOSIS — R92.0 MICROCALCIFICATION OF LEFT BREAST ON MAMMOGRAM: ICD-10-CM

## 2020-06-01 PROCEDURE — 77066 DX MAMMO INCL CAD BI: CPT | Performed by: SURGERY

## 2020-06-01 PROCEDURE — 77062 BREAST TOMOSYNTHESIS BI: CPT | Performed by: SURGERY

## 2020-06-08 NOTE — PROGRESS NOTES
Future Appointments  6/22/2020  2:15 PM    Luigi Colorado MD    [facility] General

## 2020-06-22 ENCOUNTER — OFFICE VISIT (OUTPATIENT)
Dept: SURGERY | Facility: CLINIC | Age: 50
End: 2020-06-22
Payer: COMMERCIAL

## 2020-06-22 VITALS
SYSTOLIC BLOOD PRESSURE: 108 MMHG | TEMPERATURE: 97 F | WEIGHT: 168 LBS | HEART RATE: 61 BPM | DIASTOLIC BLOOD PRESSURE: 69 MMHG | BODY MASS INDEX: 25 KG/M2

## 2020-06-22 DIAGNOSIS — N60.91 ATYPICAL DUCTAL HYPERPLASIA OF RIGHT BREAST: Primary | ICD-10-CM

## 2020-06-22 PROCEDURE — 99212 OFFICE O/P EST SF 10 MIN: CPT | Performed by: SURGERY

## 2020-06-22 NOTE — PROGRESS NOTES
Follow Up Visit Note       Active Problems      1.  Atypical ductal hyperplasia of right breast          Chief Complaint   Patient presents with:  Breast Follow-up: 10 MONTH MARY AND EXAM, PT DENIES PAIN OR DISCHARGE        History of Present Illness  The pat 4/22/2016    Performed by Nolberto Mccord MD at [facility] MAIN OR   • MARY BIOPSY STEREO NODULE 1 SITE LEFT (CPT=19081)  2010    BENIGN   • MARY BIOPSY STEREO NODULE 1 SITE RIGHT (CPT=19081)  2019   • MARY BIOPSY STEREO NODULE 2 SITE BILAT (CPT=19081/29283) Left education: Not on file      Highest education level: Not on file    Occupational History      Occupation: occupational therapist        Comment: [employer]    Tobacco Use      Smoking status: Never Smoker      Smokeless tobacco: Never Used tablets by mouth nightly. , Disp: 30 capsule, Rfl: 0  •  Sodium Chloride, Hypertonic, (CHADD 128) 5 % Ophthalmic Ointment, Place 1 drop into both eyes 2 (two) times daily. , Disp: , Rfl:      Review of Systems  The Review of Systems has been reviewed by bucky guadalupe tenderness. Right breast incision well-healed   Neurological: She is alert and oriented to person, place, and time. Skin: Skin is intact. She is not diaphoretic. Psychiatric: She has a normal mood and affect.  Her speech is normal and behavior is

## 2020-08-20 ENCOUNTER — LAB ENCOUNTER (OUTPATIENT)
Dept: LAB | Facility: HOSPITAL | Age: 50
End: 2020-08-20
Attending: PHYSICIAN ASSISTANT
Payer: COMMERCIAL

## 2020-08-20 DIAGNOSIS — E03.9 MYXEDEMA HEART DISEASE: ICD-10-CM

## 2020-08-20 DIAGNOSIS — Z51.81 ENCOUNTER FOR THERAPEUTIC DRUG MONITORING: ICD-10-CM

## 2020-08-20 DIAGNOSIS — I51.9 MYXEDEMA HEART DISEASE: ICD-10-CM

## 2020-08-20 DIAGNOSIS — R53.83 FATIGUE: ICD-10-CM

## 2020-08-20 DIAGNOSIS — E78.5 HYPERLIPEMIA: Primary | ICD-10-CM

## 2020-08-20 DIAGNOSIS — I70.8 ATHEROSCLEROSIS OF ARTERIES: ICD-10-CM

## 2020-08-20 LAB
CHOLEST SMN-MCNC: 205 MG/DL (ref ?–200)
CRP SERPL HS-MCNC: 1.15 MG/L (ref ?–3)
HDLC SERPL-MCNC: 71 MG/DL (ref 40–59)
LDLC SERPL CALC-MCNC: 124 MG/DL (ref ?–100)
NONHDLC SERPL-MCNC: 134 MG/DL (ref ?–130)
PATIENT FASTING Y/N/NP: YES
T3FREE SERPL-MCNC: 2.41 PG/ML (ref 2.4–4.2)
T4 FREE SERPL-MCNC: 0.5 NG/DL (ref 0.8–1.7)
THYROPEROXIDASE AB SERPL-ACNC: 1030 U/ML (ref ?–60)
TRIGL SERPL-MCNC: 50 MG/DL (ref 30–149)
TSI SER-ACNC: 0.68 MIU/ML (ref 0.36–3.74)
VLDLC SERPL CALC-MCNC: 10 MG/DL (ref 0–30)

## 2020-08-20 PROCEDURE — 84443 ASSAY THYROID STIM HORMONE: CPT

## 2020-08-20 PROCEDURE — 86376 MICROSOMAL ANTIBODY EACH: CPT

## 2020-08-20 PROCEDURE — 86141 C-REACTIVE PROTEIN HS: CPT

## 2020-08-20 PROCEDURE — 83698 ASSAY LIPOPROTEIN PLA2: CPT

## 2020-08-20 PROCEDURE — 83695 ASSAY OF LIPOPROTEIN(A): CPT

## 2020-08-20 PROCEDURE — 84482 T3 REVERSE: CPT

## 2020-08-20 PROCEDURE — 80061 LIPID PANEL: CPT

## 2020-08-20 PROCEDURE — 84481 FREE ASSAY (FT-3): CPT

## 2020-08-20 PROCEDURE — 36415 COLL VENOUS BLD VENIPUNCTURE: CPT

## 2020-08-20 PROCEDURE — 84439 ASSAY OF FREE THYROXINE: CPT

## 2020-08-20 PROCEDURE — 82172 ASSAY OF APOLIPOPROTEIN: CPT

## 2020-08-22 LAB
APOLIPOPROTEIN, B: 91 MG/DL
LIPOPROTEIN (A): 35 MG/DL

## 2020-08-23 LAB — TRIIODOTHYRONINE, REVERSE: 6.3 NG/DL

## 2020-11-11 ENCOUNTER — OFFICE VISIT (OUTPATIENT)
Dept: FAMILY MEDICINE CLINIC | Facility: CLINIC | Age: 50
End: 2020-11-11
Payer: COMMERCIAL

## 2020-11-11 VITALS
TEMPERATURE: 98 F | BODY MASS INDEX: 25.91 KG/M2 | OXYGEN SATURATION: 98 % | HEART RATE: 71 BPM | WEIGHT: 171 LBS | DIASTOLIC BLOOD PRESSURE: 70 MMHG | RESPIRATION RATE: 16 BRPM | HEIGHT: 68 IN | SYSTOLIC BLOOD PRESSURE: 110 MMHG

## 2020-11-11 DIAGNOSIS — Z80.0 FAMILY HISTORY OF COLON CANCER REQUIRING SCREENING COLONOSCOPY: ICD-10-CM

## 2020-11-11 DIAGNOSIS — Z00.00 HEALTH CARE MAINTENANCE: Primary | ICD-10-CM

## 2020-11-11 PROCEDURE — 3078F DIAST BP <80 MM HG: CPT | Performed by: NURSE PRACTITIONER

## 2020-11-11 PROCEDURE — 99072 ADDL SUPL MATRL&STAF TM PHE: CPT | Performed by: NURSE PRACTITIONER

## 2020-11-11 PROCEDURE — 99386 PREV VISIT NEW AGE 40-64: CPT | Performed by: NURSE PRACTITIONER

## 2020-11-11 PROCEDURE — 3008F BODY MASS INDEX DOCD: CPT | Performed by: NURSE PRACTITIONER

## 2020-11-11 PROCEDURE — 3074F SYST BP LT 130 MM HG: CPT | Performed by: NURSE PRACTITIONER

## 2020-11-11 PROCEDURE — 93000 ELECTROCARDIOGRAM COMPLETE: CPT | Performed by: NURSE PRACTITIONER

## 2020-11-11 RX ORDER — THYROID 30 MG/1
TABLET ORAL
COMMUNITY
Start: 2020-09-14 | End: 2021-07-08

## 2020-11-11 NOTE — PATIENT INSTRUCTIONS
Fasting labs in the next week. EKG today. Mammogram as ordered by Dr. Duc Cheung. Dr. Nirmal Cali for screening colonoscopy. Continue with your follow ups with your specialists. Continue with dietary changes and walking/exercise.    Routine wellness in 1 yea

## 2020-11-11 NOTE — PROGRESS NOTES
[facility] [place]    HPI:     Stella Watkins is a 53year old female who presents for an Annual Health Visit. New patient to office. Establishing care.       Has a history of total hysterectomy with ovaries removal in 2019 with Dr. Rajiv Rivera. mouth daily. • Multiple Vitamins-Minerals (EYE-SYLVIA EXTRA PLUS LUTEIN) Oral Tab Take 1 tablet by mouth daily. • Pyridoxine HCl (B-6) 100 MG Oral Tab Take 1 tablet by mouth daily.      • Thiamine HCl (B-1) 100 MG Oral Tab Take 1 tablet by mouth daily Oscar Jarvis MD at [facility] MAIN OR   • ENDOMETRIAL ABLATION     • HYSTERECTOMY  03/2019   • HYSTEROSCOPY ENDOMETRIAL ABLATION N/A 4/22/2016    Performed by Mathew Edmondson MD at [facility] MAIN OR   • MARY BIOPSY STEREO NODULE 1 SITE LEFT (CPT=19081)  2010 tobacco: Never Used    Alcohol use: No      Frequency: Never    Drug use: No    Social History    Social History Narrative      Not on file       REVIEW OF SYSTEMS:   GENERAL HEALTH: feels well otherwise despite chronic conditions  SKIN: denies any unusual no JVD, no TMG, no carotid bruits  RESPIRATORY: Clear to auscultation bilterally, no rales/rhonchi/wheezing.   CARDIOVASCULAR: S1, S2 normal, RRR; no S3, no S4; no click; murmur negative; EKG normal sinus rhythm  ABDOMEN: normal active BS+, soft, nontender, screenings and vaccinations reviewed with patient.      Problem List:  Patient Active Problem List:     Other acne     Fatigue     Vitamin D deficiency     Hypothyroidism     GERD (gastroesophageal reflux disease)     Allergic rhinitis     Dry eyes     Hist

## 2020-11-20 ENCOUNTER — LAB ENCOUNTER (OUTPATIENT)
Dept: LAB | Age: 50
End: 2020-11-20
Attending: NURSE PRACTITIONER
Payer: COMMERCIAL

## 2020-11-20 DIAGNOSIS — Z00.00 HEALTH CARE MAINTENANCE: ICD-10-CM

## 2020-11-20 PROCEDURE — 80053 COMPREHEN METABOLIC PANEL: CPT | Performed by: NURSE PRACTITIONER

## 2020-11-20 PROCEDURE — 85025 COMPLETE CBC W/AUTO DIFF WBC: CPT | Performed by: NURSE PRACTITIONER

## 2020-11-20 PROCEDURE — 80061 LIPID PANEL: CPT | Performed by: NURSE PRACTITIONER

## 2020-11-20 PROCEDURE — 36415 COLL VENOUS BLD VENIPUNCTURE: CPT | Performed by: NURSE PRACTITIONER

## 2021-04-07 ENCOUNTER — OFFICE VISIT (OUTPATIENT)
Dept: FAMILY MEDICINE CLINIC | Facility: CLINIC | Age: 51
End: 2021-04-07
Payer: COMMERCIAL

## 2021-04-07 VITALS
SYSTOLIC BLOOD PRESSURE: 110 MMHG | DIASTOLIC BLOOD PRESSURE: 74 MMHG | BODY MASS INDEX: 27.13 KG/M2 | HEIGHT: 68 IN | TEMPERATURE: 98 F | RESPIRATION RATE: 14 BRPM | WEIGHT: 179 LBS | HEART RATE: 64 BPM | OXYGEN SATURATION: 98 %

## 2021-04-07 DIAGNOSIS — E78.5 HYPERLIPIDEMIA, UNSPECIFIED HYPERLIPIDEMIA TYPE: ICD-10-CM

## 2021-04-07 DIAGNOSIS — K21.00 GASTROESOPHAGEAL REFLUX DISEASE WITH ESOPHAGITIS: ICD-10-CM

## 2021-04-07 DIAGNOSIS — Z12.11 COLON CANCER SCREENING: Primary | ICD-10-CM

## 2021-04-07 PROCEDURE — 99203 OFFICE O/P NEW LOW 30 MIN: CPT | Performed by: FAMILY MEDICINE

## 2021-04-07 PROCEDURE — 3074F SYST BP LT 130 MM HG: CPT | Performed by: FAMILY MEDICINE

## 2021-04-07 PROCEDURE — 3078F DIAST BP <80 MM HG: CPT | Performed by: FAMILY MEDICINE

## 2021-04-07 PROCEDURE — 3008F BODY MASS INDEX DOCD: CPT | Performed by: FAMILY MEDICINE

## 2021-04-07 RX ORDER — PANTOPRAZOLE SODIUM 40 MG/1
40 TABLET, DELAYED RELEASE ORAL
Qty: 180 TABLET | Refills: 0 | Status: SHIPPED | OUTPATIENT
Start: 2021-04-07

## 2021-05-02 ENCOUNTER — LAB ENCOUNTER (OUTPATIENT)
Dept: LAB | Facility: HOSPITAL | Age: 51
End: 2021-05-02
Attending: INTERNAL MEDICINE
Payer: COMMERCIAL

## 2021-05-02 DIAGNOSIS — Z01.818 PRE-OP TESTING: ICD-10-CM

## 2021-05-05 PROBLEM — Z80.0 FAMILY HISTORY OF COLON CANCER: Status: ACTIVE | Noted: 2021-05-05

## 2021-05-05 PROBLEM — D12.0 BENIGN NEOPLASM OF CECUM: Status: ACTIVE | Noted: 2021-05-05

## 2021-05-05 PROBLEM — Z12.11 SPECIAL SCREENING FOR MALIGNANT NEOPLASM OF COLON: Status: ACTIVE | Noted: 2021-05-05

## 2021-05-05 PROBLEM — D12.2 BENIGN NEOPLASM OF ASCENDING COLON: Status: ACTIVE | Noted: 2021-05-05

## 2021-06-08 ENCOUNTER — HOSPITAL ENCOUNTER (OUTPATIENT)
Dept: MAMMOGRAPHY | Facility: HOSPITAL | Age: 51
Discharge: HOME | End: 2021-06-08
Attending: SURGERY
Payer: COMMERCIAL

## 2021-06-08 DIAGNOSIS — N60.91 ATYPICAL DUCTAL HYPERPLASIA OF RIGHT BREAST: ICD-10-CM

## 2021-06-08 PROCEDURE — 77063 BREAST TOMOSYNTHESIS BI: CPT | Performed by: SURGERY

## 2021-06-08 PROCEDURE — 77067 SCR MAMMO BI INCL CAD: CPT | Performed by: SURGERY

## 2021-06-21 ENCOUNTER — OFFICE VISIT (OUTPATIENT)
Dept: SURGERY | Facility: CLINIC | Age: 51
End: 2021-06-21
Payer: COMMERCIAL

## 2021-06-21 VITALS
TEMPERATURE: 97 F | DIASTOLIC BLOOD PRESSURE: 75 MMHG | HEIGHT: 68 IN | BODY MASS INDEX: 27.13 KG/M2 | WEIGHT: 179 LBS | HEART RATE: 68 BPM | SYSTOLIC BLOOD PRESSURE: 110 MMHG

## 2021-06-21 DIAGNOSIS — Z80.3 FAMILY HISTORY OF BREAST CANCER: ICD-10-CM

## 2021-06-21 DIAGNOSIS — N60.91 ATYPICAL DUCTAL HYPERPLASIA OF RIGHT BREAST: Primary | ICD-10-CM

## 2021-06-21 PROCEDURE — 99212 OFFICE O/P EST SF 10 MIN: CPT | Performed by: SURGERY

## 2021-06-21 PROCEDURE — 3008F BODY MASS INDEX DOCD: CPT | Performed by: SURGERY

## 2021-06-21 PROCEDURE — 3078F DIAST BP <80 MM HG: CPT | Performed by: SURGERY

## 2021-06-21 PROCEDURE — 3074F SYST BP LT 130 MM HG: CPT | Performed by: SURGERY
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
## 2021-06-25 ENCOUNTER — TELEPHONE (OUTPATIENT)
Dept: FAMILY MEDICINE CLINIC | Facility: CLINIC | Age: 51
End: 2021-06-25

## 2021-07-01 ENCOUNTER — TELEPHONE (OUTPATIENT)
Dept: FAMILY MEDICINE CLINIC | Facility: CLINIC | Age: 51
End: 2021-07-01

## 2021-07-01 NOTE — TELEPHONE ENCOUNTER
Patient due for repeat lipid  Order in  Future Appointments   Date Time Provider Jayy Sanabria   7/8/2021 11:30 AM Juliano Barcenas MD [address] [facility]   9/2/2021  8:30 AM Rose Mary Cazares MD [facility] SUB GI   9/22/2021 11:45 AM Art Aceves MD

## 2021-07-08 ENCOUNTER — OFFICE VISIT (OUTPATIENT)
Dept: SURGERY | Facility: CLINIC | Age: 51
End: 2021-07-08
Payer: COMMERCIAL

## 2021-07-08 VITALS
HEIGHT: 68.4 IN | OXYGEN SATURATION: 98 % | DIASTOLIC BLOOD PRESSURE: 64 MMHG | BODY MASS INDEX: 26.62 KG/M2 | SYSTOLIC BLOOD PRESSURE: 102 MMHG | HEART RATE: 69 BPM | WEIGHT: 177.69 LBS

## 2021-07-08 DIAGNOSIS — R63.2 BINGE EATING: ICD-10-CM

## 2021-07-08 DIAGNOSIS — E78.5 DYSLIPIDEMIA: Primary | ICD-10-CM

## 2021-07-08 DIAGNOSIS — R63.5 WEIGHT GAIN: ICD-10-CM

## 2021-07-08 DIAGNOSIS — E66.3 OVERWEIGHT (BMI 25.0-29.9): ICD-10-CM

## 2021-07-08 PROCEDURE — 3078F DIAST BP <80 MM HG: CPT | Performed by: INTERNAL MEDICINE

## 2021-07-08 PROCEDURE — 3008F BODY MASS INDEX DOCD: CPT | Performed by: INTERNAL MEDICINE

## 2021-07-08 PROCEDURE — 99204 OFFICE O/P NEW MOD 45 MIN: CPT | Performed by: INTERNAL MEDICINE

## 2021-07-08 PROCEDURE — 3074F SYST BP LT 130 MM HG: CPT | Performed by: INTERNAL MEDICINE

## 2021-07-08 RX ORDER — LEVOTHYROXINE AND LIOTHYRONINE 9.5; 2.25 UG/1; UG/1
15 TABLET ORAL DAILY
COMMUNITY

## 2021-07-08 NOTE — PROGRESS NOTES
The [facility] Consultation Note       Patient:  Hossein Barajas  :      2/3/1970  MRN:      [id number]    Referring Provider: Dr. Cortez ref.  provider found       Chief Complaint:  Patient presents with:  Consult: Non-surgical weight manage Sodium (TIROSINT) 25 MCG Oral Cap Take 1 capsule by mouth daily. • Liothyronine Sodium 5 MCG Oral Tab Take 20 mcg by mouth daily. • Coenzyme Q10 (COQ-10 OR) Take 1 tablet by mouth daily.  30 capsule 0   • MAGNESIUM OR Take by mouth See Admin Instruc (Medical):       Lack of Transportation (Non-Medical):   Physical Activity:       Days of Exercise per Week:       Minutes of Exercise per Session:   Stress:       Feeling of Stress :   Social Connections:       Frequency of Communication with Friends and BLADDER   • Heart Disorder Maternal Grandmother    • Heart Disorder Maternal Grandfather    • Other (Other) Maternal Grandfather    • Other (chf) Maternal Grandfather    • Heart Disorder Paternal Grandmother    • Heart Attack Brother          maker MI cooperative  Head: Normocephalic, without obvious abnormality, atraumatic  Lungs: clear to auscultation bilaterally  Heart: S1, S2 normal, no murmur, click, rub or gallop, regular rate and rhythm  Abdomen: soft, non-tender; bowel sounds normal; no masses,

## 2021-08-02 ENCOUNTER — TELEPHONE (OUTPATIENT)
Dept: FAMILY MEDICINE CLINIC | Facility: CLINIC | Age: 51
End: 2021-08-02

## 2021-08-02 NOTE — TELEPHONE ENCOUNTER
Due for repeat lipid  [facility] sent  Future Appointments   Date Time Provider Jayy Sanabria   9/2/2021  8:30 AM Ayala Delgado MD [facility] SUB GI   9/22/2021 11:45 AM Felicia Moore MD [facility] Michelle Salgado   9/23/2021 10:00 AM Lowell Cheatham MD [facility]

## 2021-09-22 ENCOUNTER — OFFICE VISIT (OUTPATIENT)
Dept: FAMILY MEDICINE CLINIC | Facility: CLINIC | Age: 51
End: 2021-09-22
Payer: COMMERCIAL

## 2021-09-22 VITALS
HEART RATE: 78 BPM | TEMPERATURE: 97 F | OXYGEN SATURATION: 98 % | SYSTOLIC BLOOD PRESSURE: 104 MMHG | DIASTOLIC BLOOD PRESSURE: 60 MMHG | RESPIRATION RATE: 18 BRPM | BODY MASS INDEX: 24.25 KG/M2 | HEIGHT: 68 IN | WEIGHT: 160 LBS

## 2021-09-22 DIAGNOSIS — J30.9 ALLERGIC RHINITIS, UNSPECIFIED SEASONALITY, UNSPECIFIED TRIGGER: ICD-10-CM

## 2021-09-22 DIAGNOSIS — E78.5 HYPERLIPIDEMIA, UNSPECIFIED HYPERLIPIDEMIA TYPE: ICD-10-CM

## 2021-09-22 DIAGNOSIS — E04.1 THYROID CYST: Primary | ICD-10-CM

## 2021-09-22 PROCEDURE — 99214 OFFICE O/P EST MOD 30 MIN: CPT | Performed by: FAMILY MEDICINE

## 2021-09-22 PROCEDURE — 3078F DIAST BP <80 MM HG: CPT | Performed by: FAMILY MEDICINE

## 2021-09-22 PROCEDURE — 3074F SYST BP LT 130 MM HG: CPT | Performed by: FAMILY MEDICINE

## 2021-09-22 PROCEDURE — 3008F BODY MASS INDEX DOCD: CPT | Performed by: FAMILY MEDICINE

## 2021-09-22 RX ORDER — FLUTICASONE PROPIONATE 50 MCG
2 SPRAY, SUSPENSION (ML) NASAL DAILY
Qty: 1 EACH | Refills: 0 | Status: SHIPPED | OUTPATIENT
Start: 2021-09-22 | End: 2022-01-12
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
## 2021-09-23 ENCOUNTER — OFFICE VISIT (OUTPATIENT)
Dept: SURGERY | Facility: CLINIC | Age: 51
End: 2021-09-23
Payer: COMMERCIAL

## 2021-09-23 VITALS
WEIGHT: 161.63 LBS | BODY MASS INDEX: 24.21 KG/M2 | SYSTOLIC BLOOD PRESSURE: 109 MMHG | DIASTOLIC BLOOD PRESSURE: 63 MMHG | OXYGEN SATURATION: 100 % | HEIGHT: 68.4 IN | HEART RATE: 67 BPM

## 2021-09-23 DIAGNOSIS — E66.3 OVERWEIGHT (BMI 25.0-29.9): ICD-10-CM

## 2021-09-23 DIAGNOSIS — E78.5 DYSLIPIDEMIA: Primary | ICD-10-CM

## 2021-09-23 DIAGNOSIS — R63.2 BINGE EATING: ICD-10-CM

## 2021-09-23 DIAGNOSIS — Z51.81 ENCOUNTER FOR THERAPEUTIC DRUG MONITORING: ICD-10-CM

## 2021-09-23 PROCEDURE — 3078F DIAST BP <80 MM HG: CPT | Performed by: INTERNAL MEDICINE

## 2021-09-23 PROCEDURE — 99214 OFFICE O/P EST MOD 30 MIN: CPT | Performed by: INTERNAL MEDICINE

## 2021-09-23 PROCEDURE — 3008F BODY MASS INDEX DOCD: CPT | Performed by: INTERNAL MEDICINE

## 2021-09-23 PROCEDURE — 3074F SYST BP LT 130 MM HG: CPT | Performed by: INTERNAL MEDICINE

## 2021-09-23 NOTE — PROGRESS NOTES
[address], [facility]  [address] [facility] [address]  Dept: [phone]       Patient:  Trip Thorpe  :      2/3/1970  MRN:      [id number]    Chief Complaint:  Patient presen oz (80.6 kg)      Patient Medications:    Current Outpatient Medications   Medication Sig Dispense Refill   • Fluticasone Propionate 50 MCG/ACT Nasal Suspension 2 sprays by Each Nare route daily.  1 each 0   • thyroid 15 MG Oral Tab Take 15 mg by mouth julio Weight Concern: Not Asked        Special Diet: Not Asked        Back Care: Not Asked        Exercise: Yes          walking        Bike Helmet: Not Asked        Seat Belt: Yes        Self-Exams: Not Asked    Social History Narrative      Not on file    Soc benign   • MARY BIOPSY STEREO W/CALC 1 SITE LEFT (CPT=19081)  2019    Benign   • MARY LOCALIZATION WIRE 1 SITE RIGHT (CPT=19281)  12/2019    ADH   • OTHER SURGICAL HISTORY  2006    septoplasty   • OTHER SURGICAL HISTORY N/A 4/22/2016    Procedure: Linsey Iniguez per day:    · Amount of water (in ounces) per day:  64  · Drinking between meals only:  yes  · Toughest challenge:  Food/binge    Nutritional Goals  Limit carbohydrates to 100 gms per day, Eat 100-200 calories within 1 hour of waking  and Eat 3-4 cups of f Dyslipidemia  (primary encounter diagnosis)  Binge eating  Overweight (bmi 25.0-29. 9)  Encounter for therapeutic drug monitoring    PLAN     Patient is not interested in bariatric surgery. Patient desires to pursue traditional weight loss at this time.

## 2021-09-27 ENCOUNTER — LAB ENCOUNTER (OUTPATIENT)
Dept: LAB | Facility: HOSPITAL | Age: 51
End: 2021-09-27
Attending: INTERNAL MEDICINE
Payer: COMMERCIAL

## 2021-09-27 DIAGNOSIS — Z01.818 PRE-OP TESTING: ICD-10-CM

## 2021-10-26 ENCOUNTER — HOSPITAL ENCOUNTER (OUTPATIENT)
Dept: ULTRASOUND IMAGING | Facility: HOSPITAL | Age: 51
Discharge: HOME | End: 2021-10-26
Attending: FAMILY MEDICINE
Payer: COMMERCIAL

## 2021-10-26 DIAGNOSIS — E04.1 THYROID CYST: ICD-10-CM

## 2021-10-26 PROCEDURE — 76536 US EXAM OF HEAD AND NECK: CPT | Performed by: FAMILY MEDICINE

## 2021-12-22 ENCOUNTER — TELEPHONE (OUTPATIENT)
Dept: INTEGRATIVE MEDICINE | Facility: CLINIC | Age: 51
End: 2021-12-22

## 2021-12-22 ENCOUNTER — PATIENT MESSAGE (OUTPATIENT)
Dept: INTEGRATIVE MEDICINE | Facility: CLINIC | Age: 51
End: 2021-12-22

## 2021-12-22 ENCOUNTER — OFFICE VISIT (OUTPATIENT)
Dept: INTEGRATIVE MEDICINE | Facility: CLINIC | Age: 51
End: 2021-12-22
Payer: COMMERCIAL

## 2021-12-22 VITALS
DIASTOLIC BLOOD PRESSURE: 60 MMHG | WEIGHT: 169.88 LBS | SYSTOLIC BLOOD PRESSURE: 100 MMHG | HEIGHT: 69 IN | HEART RATE: 70 BPM | OXYGEN SATURATION: 99 % | BODY MASS INDEX: 25.16 KG/M2

## 2021-12-22 DIAGNOSIS — Z12.11 ENCOUNTER FOR SCREENING COLONOSCOPY: ICD-10-CM

## 2021-12-22 DIAGNOSIS — Z78.0 MENOPAUSE: ICD-10-CM

## 2021-12-22 DIAGNOSIS — H00.014 HORDEOLUM EXTERNUM OF LEFT UPPER EYELID: Primary | ICD-10-CM

## 2021-12-22 DIAGNOSIS — E06.3 HYPOTHYROIDISM DUE TO HASHIMOTO'S THYROIDITIS: ICD-10-CM

## 2021-12-22 DIAGNOSIS — E03.8 HYPOTHYROIDISM DUE TO HASHIMOTO'S THYROIDITIS: ICD-10-CM

## 2021-12-22 DIAGNOSIS — Z90.710 S/P TOTAL ABDOMINAL HYSTERECTOMY: ICD-10-CM

## 2021-12-22 DIAGNOSIS — Z82.49 FAMILY HISTORY OF EARLY CAD: ICD-10-CM

## 2021-12-22 DIAGNOSIS — E04.1 THYROID NODULE: Primary | ICD-10-CM

## 2021-12-22 PROCEDURE — 99215 OFFICE O/P EST HI 40 MIN: CPT | Performed by: FAMILY MEDICINE

## 2021-12-22 PROCEDURE — 3074F SYST BP LT 130 MM HG: CPT | Performed by: FAMILY MEDICINE

## 2021-12-22 PROCEDURE — 3008F BODY MASS INDEX DOCD: CPT | Performed by: FAMILY MEDICINE

## 2021-12-22 PROCEDURE — 3078F DIAST BP <80 MM HG: CPT | Performed by: FAMILY MEDICINE

## 2021-12-22 RX ORDER — CHOLECALCIFEROL (VITAMIN D3) 50 MCG
1 TABLET ORAL DAILY
COMMUNITY

## 2021-12-22 NOTE — TELEPHONE ENCOUNTER
Patient was seen today, stated she discussed the sty in her eye. Requesting some ointment erythromycin ointment - Pharmacy [facility].  Thank you

## 2021-12-22 NOTE — PATIENT INSTRUCTIONS
Supplement/Nutrient Support:    Vitamin E Vaginal suppositories by [employer]     Or     Estriol Vaginal Suppositories         Tomorrow’s Nutrition Sunfiber® delivers 6 grams of clinically proven, clear, grit free soluble fiber.  A true regulating fiber, imp [facility]        Lifestyle Recommendations:     Recommend at least 150 minutes exercise per week: 30 minutes/day 5 days a week of combined resistance and cardio. Education/Reading:         The Toxin Solution by Malorie Nicholas raising the level of HDL (good) cholesterol in the blood. After menopause, the risk for heart disease rises sharply. Talk with your healthcare provider about ways to protect your heart health.    How HT helps  Hormone therapy (HT) replaces hormones your bod

## 2021-12-22 NOTE — TELEPHONE ENCOUNTER
From: Danney Ganser  To: Glendell Dubin, DO  Sent: 12/22/2021 11:23 AM CST  Subject: Eye sty    Hi. I don’t see the prescription for the eye ointment cream for the sty.  Wondering if it can be called in to the prescription on file in [facility]

## 2021-12-28 ENCOUNTER — HOSPITAL ENCOUNTER (OUTPATIENT)
Dept: MRI IMAGING | Facility: HOSPITAL | Age: 51
Discharge: HOME | End: 2021-12-28
Attending: SURGERY
Payer: COMMERCIAL

## 2021-12-28 ENCOUNTER — TELEPHONE (OUTPATIENT)
Dept: SURGERY | Facility: CLINIC | Age: 51
End: 2021-12-28

## 2021-12-28 DIAGNOSIS — N60.91 ATYPICAL DUCTAL HYPERPLASIA OF RIGHT BREAST: ICD-10-CM

## 2021-12-28 DIAGNOSIS — Z80.3 FAMILY HISTORY OF BREAST CANCER: ICD-10-CM

## 2021-12-28 PROCEDURE — A9575 INJ GADOTERATE MEGLUMI 0.1ML: HCPCS | Performed by: SURGERY

## 2021-12-28 PROCEDURE — 82565 ASSAY OF CREATININE: CPT

## 2021-12-28 PROCEDURE — 77049 MRI BREAST C-+ W/CAD BI: CPT | Performed by: SURGERY

## 2021-12-30 ENCOUNTER — PATIENT OUTREACH (OUTPATIENT)
Dept: SURGERY | Facility: CLINIC | Age: 51
End: 2021-12-30

## 2021-12-30 DIAGNOSIS — N60.91 ATYPICAL DUCTAL HYPERPLASIA OF RIGHT BREAST: Primary | ICD-10-CM

## 2021-12-30 NOTE — PROGRESS NOTES
Patient notified and verbalized understanding. Recall placed. Order placed. Appointment made.     Future Appointments  6/24/2022  8:30 AM    John Walls MD    [facility]

## 2022-01-06 PROBLEM — F43.9 STRESS: Status: ACTIVE | Noted: 2022-01-06

## 2022-01-06 NOTE — PROGRESS NOTES
[address]  Dept: [phone]       Patient:  Amberly Husbands  :      2/3/1970  MRN:      [id number]    Chief Complaint:  Patient p Wt 167 lb 3.2 oz (75.8 kg)   LMP 02/07/2019   SpO2 99%   BMI 25.13 kg/m²     Initial weight loss: +06   Total weight loss: -10   Start weight: 177    Wt Readings from Last 3 Encounters:  01/06/22 : 167 lb 3.2 oz (75.8 kg)  12/22/21 : 169 lb 14.4 oz (77.1 k Occupational History      Occupation: occupational therapist        Comment: [employer]    Tobacco Use      Smoking status: Never Smoker      Smokeless tobacco: Never Used    Vaping Use      Vaping Use: Never used    Substance and Sexual Acti Surgeon: Dilshad Fields MD;  Location: [address] [place] [address] OR   • TOTAL ABDOM HYSTERECTOMY  03/2019    Total     Family History:    Family History   Problem Relation Age of Onset   • Diabetes Father    • Heart Disorder Father         CABG   • Prostate Cancer Fat per day, Eat 100-200 calories within 1 hour of waking  and Eat 3-4 cups of fresh fruits or vegetables daily    Behavior Modifications Reviewed and Discussed  Eat breakfast, Eat 3 meals per day, Plan meals in advance, Read nutrition labels, Drink 64 oz of w bariatric surgery. Patient desires to pursue traditional weight loss at this time. DYSLIPIDEMIA: Stable on the above prescribed meal plan and medication. Liver function stable.     Lab Results   Component Value Date/Time    CHOLEST 218 (H) 11/20/2020 1

## 2022-01-11 ENCOUNTER — WALK IN (OUTPATIENT)
Dept: URGENT CARE | Age: 52
End: 2022-01-11

## 2022-01-11 VITALS
TEMPERATURE: 97.5 F | DIASTOLIC BLOOD PRESSURE: 78 MMHG | HEIGHT: 69 IN | WEIGHT: 164 LBS | HEART RATE: 88 BPM | BODY MASS INDEX: 24.29 KG/M2 | RESPIRATION RATE: 18 BRPM | SYSTOLIC BLOOD PRESSURE: 118 MMHG | OXYGEN SATURATION: 98 %

## 2022-01-11 DIAGNOSIS — J02.9 VIRAL PHARYNGITIS: Primary | ICD-10-CM

## 2022-01-11 LAB
INTERNAL PROCEDURAL CONTROLS ACCEPTABLE: YES
S PYO AG THROAT QL IA.RAPID: NEGATIVE

## 2022-01-11 PROCEDURE — U0005 INFEC AGEN DETEC AMPLI PROBE: HCPCS | Performed by: PSYCHIATRY & NEUROLOGY

## 2022-01-11 PROCEDURE — 87880 STREP A ASSAY W/OPTIC: CPT | Performed by: NURSE PRACTITIONER

## 2022-01-11 PROCEDURE — U0003 INFECTIOUS AGENT DETECTION BY NUCLEIC ACID (DNA OR RNA); SEVERE ACUTE RESPIRATORY SYNDROME CORONAVIRUS 2 (SARS-COV-2) (CORONAVIRUS DISEASE [COVID-19]), AMPLIFIED PROBE TECHNIQUE, MAKING USE OF HIGH THROUGHPUT TECHNOLOGIES AS DESCRIBED BY CMS-2020-01-R: HCPCS | Performed by: PSYCHIATRY & NEUROLOGY

## 2022-01-11 RX ORDER — CHLORAL HYDRATE 500 MG
1000 CAPSULE ORAL DAILY
COMMUNITY

## 2022-01-11 RX ORDER — FOLIC ACID 0.8 MG
TABLET ORAL
COMMUNITY

## 2022-01-11 RX ORDER — LIOTHYRONINE SODIUM 5 UG/1
20 TABLET ORAL
COMMUNITY

## 2022-01-11 RX ORDER — LEVOTHYROXINE SODIUM 25 UG/1
CAPSULE ORAL DAILY
COMMUNITY
Start: 2021-11-23

## 2022-01-11 RX ORDER — CETIRIZINE HYDROCHLORIDE 10 MG/1
10 TABLET ORAL DAILY
Qty: 30 TABLET | Refills: 0 | Status: SHIPPED | OUTPATIENT
Start: 2022-01-11

## 2022-01-11 RX ORDER — LISDEXAMFETAMINE DIMESYLATE CAPSULES 30 MG/1
30 CAPSULE ORAL
COMMUNITY
Start: 2022-01-06 | End: 2022-02-05

## 2022-01-12 LAB
SARS-COV-2 RNA RESP QL NAA+PROBE: DETECTED
SERVICE CMNT-IMP: ABNORMAL

## 2022-01-12 RX ORDER — FLUTICASONE PROPIONATE 50 MCG
SPRAY, SUSPENSION (ML) NASAL
Qty: 16 G | Refills: 0 | Status: SHIPPED | OUTPATIENT
Start: 2022-01-12

## 2022-01-12 NOTE — TELEPHONE ENCOUNTER
LOV 9/22/21 for test results.      Allergy Medication Protocol Passed 01/12/2022 11:34 AM    Appointment in the past 12 or next 3 months        Future Appointments   Date Time Provider Jayy Sanabria   1/20/2022  9:00 AM [facility] RM 1 [facility] [place]

## 2022-01-13 ENCOUNTER — TELEPHONE (OUTPATIENT)
Dept: URGENT CARE | Age: 52
End: 2022-01-13

## 2022-01-31 ENCOUNTER — TELEPHONE (OUTPATIENT)
Dept: SURGERY | Facility: CLINIC | Age: 52
End: 2022-01-31

## 2022-01-31 NOTE — TELEPHONE ENCOUNTER
Sent clinicals to [facility] for appeal to [phone].  Reji Villela RN to follow up from [facility] and can be reached at [phone]

## 2022-02-05 ENCOUNTER — ORDER TRANSCRIPTION (OUTPATIENT)
Dept: ADMINISTRATIVE | Facility: HOSPITAL | Age: 52
End: 2022-02-05

## 2022-02-07 ENCOUNTER — HOSPITAL ENCOUNTER (OUTPATIENT)
Dept: CT IMAGING | Facility: HOSPITAL | Age: 52
Discharge: HOME | End: 2022-02-07
Attending: FAMILY MEDICINE

## 2022-02-07 ENCOUNTER — LAB ENCOUNTER (OUTPATIENT)
Dept: LAB | Facility: HOSPITAL | Age: 52
End: 2022-02-07
Attending: FAMILY MEDICINE
Payer: COMMERCIAL

## 2022-02-07 ENCOUNTER — TELEPHONE (OUTPATIENT)
Dept: SURGERY | Facility: CLINIC | Age: 52
End: 2022-02-07

## 2022-02-07 DIAGNOSIS — Z90.710 S/P TOTAL ABDOMINAL HYSTERECTOMY: ICD-10-CM

## 2022-02-07 DIAGNOSIS — E04.1 THYROID NODULE: ICD-10-CM

## 2022-02-07 DIAGNOSIS — E06.3 HYPOTHYROIDISM DUE TO HASHIMOTO'S THYROIDITIS: ICD-10-CM

## 2022-02-07 DIAGNOSIS — Z82.49 FAMILY HISTORY OF EARLY CAD: ICD-10-CM

## 2022-02-07 DIAGNOSIS — Z78.0 MENOPAUSE: ICD-10-CM

## 2022-02-07 DIAGNOSIS — E03.8 HYPOTHYROIDISM DUE TO HASHIMOTO'S THYROIDITIS: ICD-10-CM

## 2022-02-07 DIAGNOSIS — R93.1 ABNORMAL SCREENING CARDIAC CT: ICD-10-CM

## 2022-02-07 LAB
ALBUMIN SERPL-MCNC: 4.2 G/DL (ref 3.4–5)
ALBUMIN/GLOB SERPL: 1.3 {RATIO} (ref 1–2)
ALP LIVER SERPL-CCNC: 88 U/L
ALT SERPL-CCNC: 57 U/L
ANION GAP SERPL CALC-SCNC: 5 MMOL/L (ref 0–18)
AST SERPL-CCNC: 27 U/L (ref 15–37)
BASOPHILS # BLD AUTO: 0.03 X10(3) UL (ref 0–0.2)
BASOPHILS NFR BLD AUTO: 0.8 %
BILIRUB SERPL-MCNC: 0.8 MG/DL (ref 0.1–2)
BUN BLD-MCNC: 19 MG/DL (ref 7–18)
CALCIUM BLD-MCNC: 9.8 MG/DL (ref 8.5–10.1)
CHLORIDE SERPL-SCNC: 104 MMOL/L (ref 98–112)
CHOLEST SERPL-MCNC: 228 MG/DL (ref ?–200)
CO2 SERPL-SCNC: 30 MMOL/L (ref 21–32)
CREAT BLD-MCNC: 0.73 MG/DL
CRP SERPL HS-MCNC: 3.36 MG/L (ref ?–3)
DEPRECATED HBV CORE AB SER IA-ACNC: 128.5 NG/ML
DHEA-S SERPL-MCNC: 105.3 UG/DL
EOSINOPHIL # BLD AUTO: 0.14 X10(3) UL (ref 0–0.7)
EOSINOPHIL NFR BLD AUTO: 3.7 %
ERYTHROCYTE [DISTWIDTH] IN BLOOD BY AUTOMATED COUNT: 13.3 %
FASTING PATIENT LIPID ANSWER: YES
FASTING STATUS PATIENT QL REPORTED: YES
GLOBULIN PLAS-MCNC: 3.3 G/DL (ref 2.8–4.4)
GLUCOSE BLD-MCNC: 89 MG/DL (ref 70–99)
HCT VFR BLD AUTO: 42.8 %
HDLC SERPL-MCNC: 82 MG/DL (ref 40–59)
HGB BLD-MCNC: 14 G/DL
IMM GRANULOCYTES # BLD AUTO: 0.01 X10(3) UL (ref 0–1)
IMM GRANULOCYTES NFR BLD: 0.3 %
LDLC SERPL CALC-MCNC: 133 MG/DL (ref ?–100)
LYMPHOCYTES # BLD AUTO: 1.33 X10(3) UL (ref 1–4)
LYMPHOCYTES NFR BLD AUTO: 35.1 %
MCH RBC QN AUTO: 30.2 PG (ref 26–34)
MCHC RBC AUTO-ENTMCNC: 32.7 G/DL (ref 31–37)
MCV RBC AUTO: 92.2 FL
MONOCYTES # BLD AUTO: 0.36 X10(3) UL (ref 0.1–1)
MONOCYTES NFR BLD AUTO: 9.5 %
NEUTROPHILS # BLD AUTO: 1.92 X10 (3) UL (ref 1.5–7.7)
NEUTROPHILS # BLD AUTO: 1.92 X10(3) UL (ref 1.5–7.7)
NEUTROPHILS NFR BLD AUTO: 50.6 %
NONHDLC SERPL-MCNC: 146 MG/DL (ref ?–130)
OSMOLALITY SERPL CALC.SUM OF ELEC: 290 MOSM/KG (ref 275–295)
PLATELET # BLD AUTO: 209 10(3)UL (ref 150–450)
POTASSIUM SERPL-SCNC: 4.1 MMOL/L (ref 3.5–5.1)
PROT SERPL-MCNC: 7.5 G/DL (ref 6.4–8.2)
RBC # BLD AUTO: 4.64 X10(6)UL
SODIUM SERPL-SCNC: 139 MMOL/L (ref 136–145)
T3FREE SERPL-MCNC: 9.74 PG/ML (ref 2.4–4.2)
T4 FREE SERPL-MCNC: 0.6 NG/DL (ref 0.8–1.7)
THYROPEROXIDASE AB SERPL-ACNC: 827 U/ML (ref ?–60)
TRIGL SERPL-MCNC: 77 MG/DL (ref 30–149)
TSI SER-ACNC: 0.72 MIU/ML (ref 0.36–3.74)
VLDLC SERPL CALC-MCNC: 14 MG/DL (ref 0–30)
WBC # BLD AUTO: 3.8 X10(3) UL (ref 4–11)

## 2022-02-07 PROCEDURE — 80061 LIPID PANEL: CPT

## 2022-02-07 PROCEDURE — 82306 VITAMIN D 25 HYDROXY: CPT

## 2022-02-07 PROCEDURE — 84482 T3 REVERSE: CPT

## 2022-02-07 PROCEDURE — 86141 C-REACTIVE PROTEIN HS: CPT

## 2022-02-07 PROCEDURE — 85025 COMPLETE CBC W/AUTO DIFF WBC: CPT

## 2022-02-07 PROCEDURE — 84443 ASSAY THYROID STIM HORMONE: CPT

## 2022-02-07 PROCEDURE — 86376 MICROSOMAL ANTIBODY EACH: CPT

## 2022-02-07 PROCEDURE — 83018 HEAVY METAL QUAN EACH NES: CPT

## 2022-02-07 PROCEDURE — 82627 DEHYDROEPIANDROSTERONE: CPT

## 2022-02-07 PROCEDURE — 84439 ASSAY OF FREE THYROXINE: CPT

## 2022-02-07 PROCEDURE — 80053 COMPREHEN METABOLIC PANEL: CPT

## 2022-02-07 PROCEDURE — 82728 ASSAY OF FERRITIN: CPT

## 2022-02-07 PROCEDURE — 84481 FREE ASSAY (FT-3): CPT

## 2022-02-07 PROCEDURE — 36415 COLL VENOUS BLD VENIPUNCTURE: CPT

## 2022-02-10 LAB — TRIIODOTHYRONINE, REVERSE: 6 NG/DL

## 2022-02-11 ENCOUNTER — TELEPHONE (OUTPATIENT)
Dept: SURGERY | Facility: CLINIC | Age: 52
End: 2022-02-11

## 2022-02-11 LAB — IODINE, SERUM: 40.4 UG/L

## 2022-02-11 NOTE — TELEPHONE ENCOUNTER
MRI BREAST APPROVED PER [facility]   AUTH # [id number]  PT HAD EXAM DONE ON 12/28/2021

## 2022-02-17 ENCOUNTER — HOSPITAL ENCOUNTER (OUTPATIENT)
Dept: BONE DENSITY | Age: 52
Discharge: HOME | End: 2022-02-17
Attending: FAMILY MEDICINE
Payer: COMMERCIAL

## 2022-02-17 DIAGNOSIS — Z78.0 MENOPAUSE: ICD-10-CM

## 2022-02-17 PROCEDURE — 77080 DXA BONE DENSITY AXIAL: CPT | Performed by: FAMILY MEDICINE

## 2022-02-21 ENCOUNTER — OFFICE VISIT (OUTPATIENT)
Dept: INTEGRATIVE MEDICINE | Facility: CLINIC | Age: 52
End: 2022-02-21
Payer: COMMERCIAL

## 2022-02-21 ENCOUNTER — PATIENT MESSAGE (OUTPATIENT)
Dept: INTEGRATIVE MEDICINE | Facility: CLINIC | Age: 52
End: 2022-02-21

## 2022-02-21 VITALS
BODY MASS INDEX: 25.65 KG/M2 | DIASTOLIC BLOOD PRESSURE: 62 MMHG | SYSTOLIC BLOOD PRESSURE: 108 MMHG | OXYGEN SATURATION: 98 % | HEART RATE: 74 BPM | WEIGHT: 173.19 LBS | HEIGHT: 69 IN

## 2022-02-21 DIAGNOSIS — R53.82 CHRONIC FATIGUE: ICD-10-CM

## 2022-02-21 DIAGNOSIS — E03.8 HYPOTHYROIDISM DUE TO HASHIMOTO'S THYROIDITIS: Primary | ICD-10-CM

## 2022-02-21 DIAGNOSIS — E06.3 HYPOTHYROIDISM DUE TO HASHIMOTO'S THYROIDITIS: Primary | ICD-10-CM

## 2022-02-21 DIAGNOSIS — Z78.0 MENOPAUSE: ICD-10-CM

## 2022-02-21 PROCEDURE — 3078F DIAST BP <80 MM HG: CPT | Performed by: FAMILY MEDICINE

## 2022-02-21 PROCEDURE — 3074F SYST BP LT 130 MM HG: CPT | Performed by: FAMILY MEDICINE

## 2022-02-21 PROCEDURE — 99214 OFFICE O/P EST MOD 30 MIN: CPT | Performed by: FAMILY MEDICINE

## 2022-02-21 PROCEDURE — 3008F BODY MASS INDEX DOCD: CPT | Performed by: FAMILY MEDICINE

## 2022-02-21 RX ORDER — LOTEPREDNOL ETABONATE 3.8 MG/G
1 GEL OPHTHALMIC 3 TIMES DAILY
COMMUNITY
Start: 2021-09-05

## 2022-02-21 RX ORDER — FLUOROURACIL 50 MG/G
CREAM TOPICAL
COMMUNITY
Start: 2021-11-23 | End: 2022-03-22

## 2022-02-21 RX ORDER — LEVOTHYROXINE SODIUM 0.05 MG/1
50 TABLET ORAL
Qty: 30 TABLET | Refills: 0 | Status: SHIPPED | OUTPATIENT
Start: 2022-02-21

## 2022-02-22 NOTE — TELEPHONE ENCOUNTER
Several Mychart messages from patient re: wrong synthroid medication being dispensed. Rx and dose ordered yesterday were correct however given need for medication w/o fillers that may include dyes, gluten, lactose or sugar must be ordered slightly differently in e-scribe system. Per pharmacist discussion, in order to dispense this special generic Levothyroxine Sodium formulation, it must be specified as Tirosint generic and \"may substitute\" checked.

## 2022-02-23 ENCOUNTER — TELEPHONE (OUTPATIENT)
Dept: INTEGRATIVE MEDICINE | Facility: CLINIC | Age: 52
End: 2022-02-23

## 2022-02-23 NOTE — TELEPHONE ENCOUNTER
Received fax from pt's pharmacy, [facility] - stating that the patient said that the Rx should be for generic \"Tirosint\" - please advise.     LOV: 02/21/22

## 2022-02-23 NOTE — TELEPHONE ENCOUNTER
Pharmacy states Tirosint is ready for pick . Pt states the brand is $7 more than the generic and states that she will pay out of pocket for Brand.

## 2022-03-22 ENCOUNTER — OFFICE VISIT (OUTPATIENT)
Dept: ENDOCRINOLOGY CLINIC | Facility: CLINIC | Age: 52
End: 2022-03-22
Payer: COMMERCIAL

## 2022-03-22 VITALS
DIASTOLIC BLOOD PRESSURE: 75 MMHG | WEIGHT: 177 LBS | HEART RATE: 76 BPM | SYSTOLIC BLOOD PRESSURE: 115 MMHG | BODY MASS INDEX: 26 KG/M2

## 2022-03-22 DIAGNOSIS — E04.1 THYROID NODULE: Primary | ICD-10-CM

## 2022-03-22 PROCEDURE — 3078F DIAST BP <80 MM HG: CPT | Performed by: INTERNAL MEDICINE

## 2022-03-22 PROCEDURE — 99243 OFF/OP CNSLTJ NEW/EST LOW 30: CPT | Performed by: INTERNAL MEDICINE

## 2022-03-22 PROCEDURE — 3074F SYST BP LT 130 MM HG: CPT | Performed by: INTERNAL MEDICINE

## 2022-04-12 RX ORDER — LEVOTHYROXINE SODIUM 0.05 MG/1
50 TABLET ORAL
Qty: 30 TABLET | Refills: 0 | Status: SHIPPED | OUTPATIENT
Start: 2022-04-12

## 2022-04-13 RX ORDER — LISDEXAMFETAMINE DIMESYLATE 30 MG/1
CAPSULE ORAL
Qty: 30 CAPSULE | Refills: 0 | Status: SHIPPED | OUTPATIENT
Start: 2022-04-13

## 2022-05-13 ENCOUNTER — LAB ENCOUNTER (OUTPATIENT)
Dept: LAB | Age: 52
End: 2022-05-13
Attending: FAMILY MEDICINE
Payer: COMMERCIAL

## 2022-05-13 DIAGNOSIS — E03.8 HYPOTHYROIDISM DUE TO HASHIMOTO'S THYROIDITIS: ICD-10-CM

## 2022-05-13 DIAGNOSIS — E06.3 HYPOTHYROIDISM DUE TO HASHIMOTO'S THYROIDITIS: ICD-10-CM

## 2022-05-13 LAB
T3FREE SERPL-MCNC: 3.28 PG/ML (ref 2.4–4.2)
T4 FREE SERPL-MCNC: 0.7 NG/DL (ref 0.8–1.7)
TSI SER-ACNC: 0.13 MIU/ML (ref 0.36–3.74)

## 2022-05-13 PROCEDURE — 84443 ASSAY THYROID STIM HORMONE: CPT

## 2022-05-13 PROCEDURE — 84482 T3 REVERSE: CPT

## 2022-05-13 PROCEDURE — 84439 ASSAY OF FREE THYROXINE: CPT

## 2022-05-13 PROCEDURE — 84481 FREE ASSAY (FT-3): CPT

## 2022-05-13 PROCEDURE — 36415 COLL VENOUS BLD VENIPUNCTURE: CPT

## 2022-05-19 LAB — TRIIODOTHYRONINE, REVERSE: 7.2 NG/DL

## 2022-05-20 ENCOUNTER — PATIENT MESSAGE (OUTPATIENT)
Dept: INTEGRATIVE MEDICINE | Facility: CLINIC | Age: 52
End: 2022-05-20

## 2022-05-20 ENCOUNTER — TELEMEDICINE (OUTPATIENT)
Dept: INTEGRATIVE MEDICINE | Facility: CLINIC | Age: 52
End: 2022-05-20

## 2022-05-20 DIAGNOSIS — Z78.0 MENOPAUSE: ICD-10-CM

## 2022-05-20 DIAGNOSIS — F43.9 STRESS: ICD-10-CM

## 2022-05-20 DIAGNOSIS — E06.3 HYPOTHYROIDISM DUE TO HASHIMOTO'S THYROIDITIS: ICD-10-CM

## 2022-05-20 DIAGNOSIS — R53.82 CHRONIC FATIGUE: Primary | ICD-10-CM

## 2022-05-20 DIAGNOSIS — E03.8 HYPOTHYROIDISM DUE TO HASHIMOTO'S THYROIDITIS: ICD-10-CM

## 2022-05-24 ENCOUNTER — OFFICE VISIT (OUTPATIENT)
Dept: SURGERY | Facility: CLINIC | Age: 52
End: 2022-05-24
Payer: COMMERCIAL

## 2022-05-24 VITALS
BODY MASS INDEX: 26.13 KG/M2 | HEART RATE: 73 BPM | HEIGHT: 68.4 IN | WEIGHT: 174.44 LBS | DIASTOLIC BLOOD PRESSURE: 70 MMHG | SYSTOLIC BLOOD PRESSURE: 110 MMHG | OXYGEN SATURATION: 98 %

## 2022-05-24 DIAGNOSIS — R63.2 BINGE EATING: Primary | ICD-10-CM

## 2022-05-24 DIAGNOSIS — E78.5 DYSLIPIDEMIA: ICD-10-CM

## 2022-05-24 DIAGNOSIS — F43.9 STRESS: ICD-10-CM

## 2022-05-24 DIAGNOSIS — E66.3 OVERWEIGHT (BMI 25.0-29.9): ICD-10-CM

## 2022-05-24 DIAGNOSIS — Z51.81 ENCOUNTER FOR THERAPEUTIC DRUG MONITORING: ICD-10-CM

## 2022-05-24 PROCEDURE — 3078F DIAST BP <80 MM HG: CPT | Performed by: INTERNAL MEDICINE

## 2022-05-24 PROCEDURE — 99214 OFFICE O/P EST MOD 30 MIN: CPT | Performed by: INTERNAL MEDICINE

## 2022-05-24 PROCEDURE — 3074F SYST BP LT 130 MM HG: CPT | Performed by: INTERNAL MEDICINE

## 2022-05-24 PROCEDURE — 3008F BODY MASS INDEX DOCD: CPT | Performed by: INTERNAL MEDICINE

## 2022-06-02 RX ORDER — LEVOTHYROXINE SODIUM 0.05 MG/1
50 TABLET ORAL
Qty: 30 TABLET | Refills: 0 | OUTPATIENT
Start: 2022-06-02

## 2022-06-02 RX ORDER — LEVOTHYROXINE SODIUM 50 UG/1
1 CAPSULE ORAL
Qty: 30 CAPSULE | Refills: 0 | Status: SHIPPED | OUTPATIENT
Start: 2022-06-02 | End: 2022-06-28

## 2022-06-03 RX ORDER — LEVOTHYROXINE SODIUM 0.05 MG/1
50 TABLET ORAL
Qty: 30 TABLET | Refills: 0 | OUTPATIENT
Start: 2022-06-03

## 2022-06-03 NOTE — TELEPHONE ENCOUNTER
Patient started a new medication. Please ensure she has a follow up in August. Let me know if she does not schedule.

## 2022-06-28 RX ORDER — LEVOTHYROXINE SODIUM 50 UG/1
1 CAPSULE ORAL
Qty: 30 CAPSULE | Refills: 0 | Status: SHIPPED | OUTPATIENT
Start: 2022-06-28

## 2022-06-30 ENCOUNTER — HOSPITAL ENCOUNTER (OUTPATIENT)
Dept: MAMMOGRAPHY | Facility: HOSPITAL | Age: 52
Discharge: HOME | End: 2022-06-30
Attending: SURGERY
Payer: COMMERCIAL

## 2022-06-30 DIAGNOSIS — N60.91 ATYPICAL DUCTAL HYPERPLASIA OF RIGHT BREAST: ICD-10-CM

## 2022-06-30 PROCEDURE — 77067 SCR MAMMO BI INCL CAD: CPT | Performed by: SURGERY

## 2022-06-30 PROCEDURE — 77063 BREAST TOMOSYNTHESIS BI: CPT | Performed by: SURGERY

## 2022-07-11 ENCOUNTER — OFFICE VISIT (OUTPATIENT)
Facility: LOCATION | Age: 52
End: 2022-07-11

## 2022-07-11 DIAGNOSIS — Z53.21 PROCEDURE AND TREATMENT NOT CARRIED OUT DUE TO PATIENT LEAVING PRIOR TO BEING SEEN BY HEALTH CARE PROVIDER: Primary | ICD-10-CM

## 2022-07-12 NOTE — H&P
Patient canceled her appointment due to the office running late. She rescheduled for another date.     Gucci Whalen MD

## 2022-07-28 ENCOUNTER — PATIENT MESSAGE (OUTPATIENT)
Dept: INTEGRATIVE MEDICINE | Facility: CLINIC | Age: 52
End: 2022-07-28

## 2022-07-29 ENCOUNTER — TELEPHONE (OUTPATIENT)
Dept: INTEGRATIVE MEDICINE | Facility: CLINIC | Age: 52
End: 2022-07-29

## 2022-07-29 RX ORDER — LEVOTHYROXINE SODIUM 50 UG/1
1 CAPSULE ORAL
Qty: 30 CAPSULE | Refills: 0 | Status: SHIPPED | OUTPATIENT
Start: 2022-07-29

## 2022-07-29 NOTE — TELEPHONE ENCOUNTER
Patient contacted clinic stating she is almost out of medication, requesting refill - Scheduled 8/18.     Thank you

## 2022-07-29 NOTE — TELEPHONE ENCOUNTER
Patient contacted clinic stating she is out of medication. Requesting refill - Scheduled 8/18.     Thank you

## 2022-08-02 ENCOUNTER — OFFICE VISIT (OUTPATIENT)
Facility: LOCATION | Age: 52
End: 2022-08-02
Payer: COMMERCIAL

## 2022-08-02 ENCOUNTER — OFFICE VISIT (OUTPATIENT)
Dept: HEMATOLOGY/ONCOLOGY | Facility: HOSPITAL | Age: 52
End: 2022-08-02
Attending: SURGERY
Payer: COMMERCIAL

## 2022-08-02 VITALS
BODY MASS INDEX: 26.85 KG/M2 | SYSTOLIC BLOOD PRESSURE: 122 MMHG | DIASTOLIC BLOOD PRESSURE: 78 MMHG | TEMPERATURE: 97 F | HEIGHT: 68.39 IN | WEIGHT: 179.19 LBS | RESPIRATION RATE: 18 BRPM | OXYGEN SATURATION: 99 % | HEART RATE: 81 BPM

## 2022-08-02 DIAGNOSIS — N60.91 ATYPICAL DUCTAL HYPERPLASIA OF RIGHT BREAST: Primary | ICD-10-CM

## 2022-08-02 DIAGNOSIS — Z80.3 FAMILY HISTORY OF BREAST CANCER IN MOTHER: ICD-10-CM

## 2022-08-02 PROCEDURE — 99212 OFFICE O/P EST SF 10 MIN: CPT | Performed by: SURGERY

## 2022-08-02 NOTE — PROGRESS NOTES
Dx: Atypical ductal hyperplasia of RT breast.     Patient presents to clinic for annual breast surveillance and exam. [facility] EMMA 2D+3D screening bilateral views performed on 6/30/22 benign findings. Breast MRI bilateral views performed on 12/28/21 Negative. Patient denies any breast complaints. Medication and allergies reviewed and updated.

## 2022-08-05 ENCOUNTER — PATIENT OUTREACH (OUTPATIENT)
Facility: LOCATION | Age: 52
End: 2022-08-05

## 2022-08-23 RX ORDER — LEVOTHYROXINE SODIUM 50 UG/1
1 CAPSULE ORAL
Qty: 30 CAPSULE | Refills: 0 | Status: SHIPPED | OUTPATIENT
Start: 2022-08-23

## 2022-09-02 RX ORDER — LEVOTHYROXINE SODIUM 50 UG/1
1 CAPSULE ORAL
Qty: 30 CAPSULE | Refills: 0 | Status: SHIPPED | OUTPATIENT
Start: 2022-09-02

## 2022-10-24 RX ORDER — LEVOTHYROXINE SODIUM 50 UG/1
1 CAPSULE ORAL
Qty: 30 CAPSULE | Refills: 0 | Status: SHIPPED | OUTPATIENT
Start: 2022-10-24

## 2022-11-21 ENCOUNTER — WALK IN (OUTPATIENT)
Dept: URGENT CARE | Age: 52
End: 2022-11-21

## 2022-11-21 VITALS
TEMPERATURE: 97.6 F | HEIGHT: 69 IN | BODY MASS INDEX: 26.36 KG/M2 | SYSTOLIC BLOOD PRESSURE: 110 MMHG | RESPIRATION RATE: 12 BRPM | OXYGEN SATURATION: 99 % | HEART RATE: 80 BPM | WEIGHT: 178 LBS | DIASTOLIC BLOOD PRESSURE: 78 MMHG

## 2022-11-21 DIAGNOSIS — J02.9 PHARYNGITIS, UNSPECIFIED ETIOLOGY: Primary | ICD-10-CM

## 2022-11-21 LAB
INTERNAL PROCEDURAL CONTROLS ACCEPTABLE: YES
S PYO AG THROAT QL IA.RAPID: NEGATIVE
TEST LOT EXPIRATION DATE: NORMAL
TEST LOT NUMBER: NORMAL

## 2022-11-21 PROCEDURE — 87880 STREP A ASSAY W/OPTIC: CPT | Performed by: NURSE PRACTITIONER

## 2022-11-21 PROCEDURE — 99213 OFFICE O/P EST LOW 20 MIN: CPT | Performed by: NURSE PRACTITIONER

## 2022-11-21 ASSESSMENT — ENCOUNTER SYMPTOMS
ACTIVITY CHANGE: 1
HOARSE VOICE: 0
TROUBLE SWALLOWING: 0
SHORTNESS OF BREATH: 0
FATIGUE: 0
COUGH: 1
GASTROINTESTINAL NEGATIVE: 1
WHEEZING: 0
SORE THROAT: 1
RHINORRHEA: 1

## 2022-11-22 RX ORDER — LIOTHYRONINE SODIUM 1 MG/G
POWDER (GRAM) MISCELLANEOUS
Qty: 90 EACH | Refills: 0 | Status: SHIPPED
Start: 2022-11-22 | End: 2022-11-23

## 2023-02-17 ENCOUNTER — TELEMEDICINE (OUTPATIENT)
Dept: INTEGRATIVE MEDICINE | Facility: CLINIC | Age: 53
End: 2023-02-17
Payer: COMMERCIAL

## 2023-02-17 DIAGNOSIS — R53.82 CHRONIC FATIGUE: ICD-10-CM

## 2023-02-17 DIAGNOSIS — E06.3 HYPOTHYROIDISM DUE TO HASHIMOTO'S THYROIDITIS: Primary | ICD-10-CM

## 2023-02-17 DIAGNOSIS — Z78.0 MENOPAUSE: ICD-10-CM

## 2023-02-17 DIAGNOSIS — E03.8 HYPOTHYROIDISM DUE TO HASHIMOTO'S THYROIDITIS: Primary | ICD-10-CM

## 2023-02-17 DIAGNOSIS — E04.1 THYROID NODULE: ICD-10-CM

## 2023-02-17 RX ORDER — LEVOTHYROXINE SODIUM 50 UG/1
1 CAPSULE ORAL
Qty: 30 CAPSULE | Refills: 3 | Status: SHIPPED | OUTPATIENT
Start: 2023-02-17

## 2023-02-22 ENCOUNTER — HOSPITAL ENCOUNTER (OUTPATIENT)
Dept: ULTRASOUND IMAGING | Age: 53
Discharge: HOME | End: 2023-02-22
Attending: FAMILY MEDICINE
Payer: COMMERCIAL

## 2023-02-22 ENCOUNTER — LAB ENCOUNTER (OUTPATIENT)
Dept: LAB | Age: 53
End: 2023-02-22
Attending: FAMILY MEDICINE
Payer: COMMERCIAL

## 2023-02-22 DIAGNOSIS — E03.8 HYPOTHYROIDISM DUE TO HASHIMOTO'S THYROIDITIS: ICD-10-CM

## 2023-02-22 DIAGNOSIS — Z78.0 MENOPAUSE: ICD-10-CM

## 2023-02-22 DIAGNOSIS — E06.3 HYPOTHYROIDISM DUE TO HASHIMOTO'S THYROIDITIS: ICD-10-CM

## 2023-02-22 DIAGNOSIS — E04.1 THYROID NODULE: ICD-10-CM

## 2023-02-22 DIAGNOSIS — R53.82 CHRONIC FATIGUE: ICD-10-CM

## 2023-02-22 LAB
ALBUMIN SERPL-MCNC: 3.9 G/DL (ref 3.4–5)
ALBUMIN/GLOB SERPL: 1.2 {RATIO} (ref 1–2)
ALP LIVER SERPL-CCNC: 67 U/L
ALT SERPL-CCNC: 28 U/L
ANION GAP SERPL CALC-SCNC: 5 MMOL/L (ref 0–18)
AST SERPL-CCNC: 20 U/L (ref 15–37)
BASOPHILS # BLD AUTO: 0.05 X10(3) UL (ref 0–0.2)
BASOPHILS NFR BLD AUTO: 1 %
BILIRUB SERPL-MCNC: 0.7 MG/DL (ref 0.1–2)
BUN BLD-MCNC: 14 MG/DL (ref 7–18)
CALCIUM BLD-MCNC: 9.5 MG/DL (ref 8.5–10.1)
CHLORIDE SERPL-SCNC: 109 MMOL/L (ref 98–112)
CHOLEST SERPL-MCNC: 218 MG/DL (ref ?–200)
CO2 SERPL-SCNC: 25 MMOL/L (ref 21–32)
CREAT BLD-MCNC: 0.73 MG/DL
EOSINOPHIL # BLD AUTO: 0.09 X10(3) UL (ref 0–0.7)
EOSINOPHIL NFR BLD AUTO: 1.7 %
ERYTHROCYTE [DISTWIDTH] IN BLOOD BY AUTOMATED COUNT: 12.8 %
FASTING PATIENT LIPID ANSWER: YES
FASTING STATUS PATIENT QL REPORTED: YES
GFR SERPLBLD BASED ON 1.73 SQ M-ARVRAT: 99 ML/MIN/1.73M2 (ref 60–?)
GLOBULIN PLAS-MCNC: 3.3 G/DL (ref 2.8–4.4)
GLUCOSE BLD-MCNC: 87 MG/DL (ref 70–99)
HCT VFR BLD AUTO: 44.1 %
HDLC SERPL-MCNC: 75 MG/DL (ref 40–59)
HGB BLD-MCNC: 14.8 G/DL
IMM GRANULOCYTES # BLD AUTO: 0.02 X10(3) UL (ref 0–1)
IMM GRANULOCYTES NFR BLD: 0.4 %
LDLC SERPL CALC-MCNC: 130 MG/DL (ref ?–100)
LYMPHOCYTES # BLD AUTO: 1.75 X10(3) UL (ref 1–4)
LYMPHOCYTES NFR BLD AUTO: 33.7 %
MCH RBC QN AUTO: 30.1 PG (ref 26–34)
MCHC RBC AUTO-ENTMCNC: 33.6 G/DL (ref 31–37)
MCV RBC AUTO: 89.6 FL
MONOCYTES # BLD AUTO: 0.45 X10(3) UL (ref 0.1–1)
MONOCYTES NFR BLD AUTO: 8.7 %
NEUTROPHILS # BLD AUTO: 2.84 X10 (3) UL (ref 1.5–7.7)
NEUTROPHILS # BLD AUTO: 2.84 X10(3) UL (ref 1.5–7.7)
NEUTROPHILS NFR BLD AUTO: 54.5 %
NONHDLC SERPL-MCNC: 143 MG/DL (ref ?–130)
OSMOLALITY SERPL CALC.SUM OF ELEC: 288 MOSM/KG (ref 275–295)
PLATELET # BLD AUTO: 246 10(3)UL (ref 150–450)
POTASSIUM SERPL-SCNC: 3.9 MMOL/L (ref 3.5–5.1)
PROT SERPL-MCNC: 7.2 G/DL (ref 6.4–8.2)
RBC # BLD AUTO: 4.92 X10(6)UL
SODIUM SERPL-SCNC: 139 MMOL/L (ref 136–145)
T3FREE SERPL-MCNC: 2.44 PG/ML (ref 2.4–4.2)
T4 FREE SERPL-MCNC: 0.7 NG/DL (ref 0.8–1.7)
THYROGLOB SERPL-MCNC: 155 U/ML (ref ?–60)
THYROPEROXIDASE AB SERPL-ACNC: 1089 U/ML (ref ?–60)
TRIGL SERPL-MCNC: 74 MG/DL (ref 30–149)
TSI SER-ACNC: 1.19 MIU/ML (ref 0.36–3.74)
VLDLC SERPL CALC-MCNC: 13 MG/DL (ref 0–30)
WBC # BLD AUTO: 5.2 X10(3) UL (ref 4–11)

## 2023-02-22 PROCEDURE — 84439 ASSAY OF FREE THYROXINE: CPT | Performed by: FAMILY MEDICINE

## 2023-02-22 PROCEDURE — 84481 FREE ASSAY (FT-3): CPT | Performed by: FAMILY MEDICINE

## 2023-02-22 PROCEDURE — 80050 GENERAL HEALTH PANEL: CPT | Performed by: FAMILY MEDICINE

## 2023-02-22 PROCEDURE — 83018 HEAVY METAL QUAN EACH NES: CPT | Performed by: FAMILY MEDICINE

## 2023-02-22 PROCEDURE — 86800 THYROGLOBULIN ANTIBODY: CPT | Performed by: FAMILY MEDICINE

## 2023-02-22 PROCEDURE — 84482 T3 REVERSE: CPT | Performed by: FAMILY MEDICINE

## 2023-02-22 PROCEDURE — 76536 US EXAM OF HEAD AND NECK: CPT | Performed by: FAMILY MEDICINE

## 2023-02-22 PROCEDURE — 80061 LIPID PANEL: CPT | Performed by: FAMILY MEDICINE

## 2023-02-22 PROCEDURE — 86376 MICROSOMAL ANTIBODY EACH: CPT | Performed by: FAMILY MEDICINE

## 2023-02-23 ENCOUNTER — HOSPITAL ENCOUNTER (OUTPATIENT)
Dept: MRI IMAGING | Facility: HOSPITAL | Age: 53
Discharge: HOME | End: 2023-02-23
Attending: SURGERY
Payer: COMMERCIAL

## 2023-02-23 DIAGNOSIS — N60.91 ATYPICAL DUCTAL HYPERPLASIA OF RIGHT BREAST: ICD-10-CM

## 2023-02-23 DIAGNOSIS — Z80.3 FAMILY HISTORY OF BREAST CANCER IN MOTHER: ICD-10-CM

## 2023-02-23 PROCEDURE — 77049 MRI BREAST C-+ W/CAD BI: CPT | Performed by: SURGERY

## 2023-02-23 PROCEDURE — A9575 INJ GADOTERATE MEGLUMI 0.1ML: HCPCS | Performed by: SURGERY

## 2023-02-23 RX ORDER — GADOTERATE MEGLUMINE 376.9 MG/ML
18 INJECTION INTRAVENOUS
Status: COMPLETED | OUTPATIENT
Start: 2023-02-23 | End: 2023-02-23

## 2023-02-23 RX ADMIN — GADOTERATE MEGLUMINE 18 ML: 376.9 INJECTION INTRAVENOUS at 18:10:00

## 2023-02-24 ENCOUNTER — PATIENT OUTREACH (OUTPATIENT)
Facility: LOCATION | Age: 53
End: 2023-02-24

## 2023-02-24 DIAGNOSIS — Z12.31 SCREENING MAMMOGRAM, ENCOUNTER FOR: Primary | ICD-10-CM

## 2023-02-26 LAB
IODINE, SERUM: 41.9 UG/L
TRIIODOTHYRONINE, REVERSE: 7.7 NG/DL

## 2023-03-08 ENCOUNTER — PATIENT MESSAGE (OUTPATIENT)
Dept: INTEGRATIVE MEDICINE | Facility: CLINIC | Age: 53
End: 2023-03-08

## 2023-05-01 ENCOUNTER — OFFICE VISIT (OUTPATIENT)
Dept: INTEGRATIVE MEDICINE | Facility: CLINIC | Age: 53
End: 2023-05-01
Payer: COMMERCIAL

## 2023-05-01 VITALS
HEART RATE: 75 BPM | BODY MASS INDEX: 28.95 KG/M2 | DIASTOLIC BLOOD PRESSURE: 78 MMHG | HEIGHT: 68 IN | OXYGEN SATURATION: 99 % | SYSTOLIC BLOOD PRESSURE: 110 MMHG | WEIGHT: 191 LBS

## 2023-05-01 DIAGNOSIS — Z00.00 WELL ADULT EXAM: Primary | ICD-10-CM

## 2023-05-01 DIAGNOSIS — E03.8 HYPOTHYROIDISM DUE TO HASHIMOTO'S THYROIDITIS: ICD-10-CM

## 2023-05-01 DIAGNOSIS — E06.3 HYPOTHYROIDISM DUE TO HASHIMOTO'S THYROIDITIS: ICD-10-CM

## 2023-05-01 DIAGNOSIS — R53.82 CHRONIC FATIGUE: ICD-10-CM

## 2023-05-01 DIAGNOSIS — Z78.0 MENOPAUSE: ICD-10-CM

## 2023-05-01 DIAGNOSIS — E04.1 THYROID NODULE: ICD-10-CM

## 2023-05-22 ENCOUNTER — OFFICE VISIT (OUTPATIENT)
Dept: ENDOCRINOLOGY CLINIC | Facility: CLINIC | Age: 53
End: 2023-05-22

## 2023-05-22 VITALS
HEIGHT: 68 IN | BODY MASS INDEX: 32.13 KG/M2 | HEART RATE: 80 BPM | WEIGHT: 212 LBS | DIASTOLIC BLOOD PRESSURE: 74 MMHG | SYSTOLIC BLOOD PRESSURE: 112 MMHG

## 2023-05-22 DIAGNOSIS — E04.1 THYROID NODULE: Primary | ICD-10-CM

## 2023-05-22 DIAGNOSIS — E21.5 PARATHYROID ABNORMALITY (HCC): ICD-10-CM

## 2023-05-22 PROCEDURE — 99213 OFFICE O/P EST LOW 20 MIN: CPT | Performed by: INTERNAL MEDICINE

## 2023-05-22 PROCEDURE — 3008F BODY MASS INDEX DOCD: CPT | Performed by: INTERNAL MEDICINE

## 2023-05-22 PROCEDURE — 3074F SYST BP LT 130 MM HG: CPT | Performed by: INTERNAL MEDICINE

## 2023-05-22 PROCEDURE — 3078F DIAST BP <80 MM HG: CPT | Performed by: INTERNAL MEDICINE

## 2023-07-07 ENCOUNTER — LAB ENCOUNTER (OUTPATIENT)
Dept: LAB | Age: 53
End: 2023-07-07
Attending: FAMILY MEDICINE
Payer: COMMERCIAL

## 2023-07-07 DIAGNOSIS — E03.8 HYPOTHYROIDISM DUE TO HASHIMOTO'S THYROIDITIS: ICD-10-CM

## 2023-07-07 DIAGNOSIS — E06.3 HYPOTHYROIDISM DUE TO HASHIMOTO'S THYROIDITIS: ICD-10-CM

## 2023-07-07 DIAGNOSIS — R53.82 CHRONIC FATIGUE: ICD-10-CM

## 2023-07-07 DIAGNOSIS — Z78.0 MENOPAUSE: ICD-10-CM

## 2023-07-07 DIAGNOSIS — E21.5 PARATHYROID ABNORMALITY (HCC): ICD-10-CM

## 2023-07-07 DIAGNOSIS — Z00.00 WELL ADULT EXAM: ICD-10-CM

## 2023-07-07 LAB
ALBUMIN SERPL-MCNC: 4.3 G/DL (ref 3.4–5)
ALBUMIN/GLOB SERPL: 1.2 {RATIO} (ref 1–2)
ALP LIVER SERPL-CCNC: 72 U/L
ALT SERPL-CCNC: 29 U/L
ANION GAP SERPL CALC-SCNC: 6 MMOL/L (ref 0–18)
AST SERPL-CCNC: 24 U/L (ref 15–37)
BASOPHILS # BLD AUTO: 0.04 X10(3) UL (ref 0–0.2)
BASOPHILS NFR BLD AUTO: 0.9 %
BILIRUB SERPL-MCNC: 0.7 MG/DL (ref 0.1–2)
BUN BLD-MCNC: 19 MG/DL (ref 7–18)
CALCIUM BLD-MCNC: 9.5 MG/DL (ref 8.5–10.1)
CHLORIDE SERPL-SCNC: 104 MMOL/L (ref 98–112)
CHOLEST SERPL-MCNC: 212 MG/DL (ref ?–200)
CO2 SERPL-SCNC: 26 MMOL/L (ref 21–32)
CREAT BLD-MCNC: 0.83 MG/DL
CRP SERPL HS-MCNC: 6.75 MG/L (ref ?–3)
EOSINOPHIL # BLD AUTO: 0.08 X10(3) UL (ref 0–0.7)
EOSINOPHIL NFR BLD AUTO: 1.8 %
ERYTHROCYTE [DISTWIDTH] IN BLOOD BY AUTOMATED COUNT: 12.8 %
EST. AVERAGE GLUCOSE BLD GHB EST-MCNC: 111 MG/DL (ref 68–126)
FASTING PATIENT LIPID ANSWER: YES
FASTING STATUS PATIENT QL REPORTED: YES
GFR SERPLBLD BASED ON 1.73 SQ M-ARVRAT: 85 ML/MIN/1.73M2 (ref 60–?)
GLOBULIN PLAS-MCNC: 3.6 G/DL (ref 2.8–4.4)
GLUCOSE BLD-MCNC: 92 MG/DL (ref 70–99)
HBA1C MFR BLD: 5.5 % (ref ?–5.7)
HCT VFR BLD AUTO: 45.2 %
HDLC SERPL-MCNC: 66 MG/DL (ref 40–59)
HGB BLD-MCNC: 14.4 G/DL
IMM GRANULOCYTES # BLD AUTO: 0.01 X10(3) UL (ref 0–1)
IMM GRANULOCYTES NFR BLD: 0.2 %
LDLC SERPL CALC-MCNC: 135 MG/DL (ref ?–100)
LYMPHOCYTES # BLD AUTO: 1.28 X10(3) UL (ref 1–4)
LYMPHOCYTES NFR BLD AUTO: 29.3 %
MCH RBC QN AUTO: 29.3 PG (ref 26–34)
MCHC RBC AUTO-ENTMCNC: 31.9 G/DL (ref 31–37)
MCV RBC AUTO: 91.9 FL
MONOCYTES # BLD AUTO: 0.39 X10(3) UL (ref 0.1–1)
MONOCYTES NFR BLD AUTO: 8.9 %
NEUTROPHILS # BLD AUTO: 2.57 X10 (3) UL (ref 1.5–7.7)
NEUTROPHILS # BLD AUTO: 2.57 X10(3) UL (ref 1.5–7.7)
NEUTROPHILS NFR BLD AUTO: 58.9 %
NONHDLC SERPL-MCNC: 146 MG/DL (ref ?–130)
OSMOLALITY SERPL CALC.SUM OF ELEC: 284 MOSM/KG (ref 275–295)
PLATELET # BLD AUTO: 254 10(3)UL (ref 150–450)
POTASSIUM SERPL-SCNC: 4 MMOL/L (ref 3.5–5.1)
PROT SERPL-MCNC: 7.9 G/DL (ref 6.4–8.2)
PTH-INTACT SERPL-MCNC: 27.9 PG/ML (ref 18.5–88)
RBC # BLD AUTO: 4.92 X10(6)UL
SODIUM SERPL-SCNC: 136 MMOL/L (ref 136–145)
T3FREE SERPL-MCNC: 2.65 PG/ML (ref 2.4–4.2)
T4 FREE SERPL-MCNC: 0.8 NG/DL (ref 0.8–1.7)
TRIGL SERPL-MCNC: 60 MG/DL (ref 30–149)
TSI SER-ACNC: 0.63 MIU/ML (ref 0.36–3.74)
VIT D+METAB SERPL-MCNC: 49.1 NG/ML (ref 30–100)
VLDLC SERPL CALC-MCNC: 11 MG/DL (ref 0–30)
WBC # BLD AUTO: 4.4 X10(3) UL (ref 4–11)

## 2023-07-07 PROCEDURE — 84439 ASSAY OF FREE THYROXINE: CPT | Performed by: FAMILY MEDICINE

## 2023-07-07 PROCEDURE — 83036 HEMOGLOBIN GLYCOSYLATED A1C: CPT | Performed by: FAMILY MEDICINE

## 2023-07-07 PROCEDURE — 80061 LIPID PANEL: CPT | Performed by: FAMILY MEDICINE

## 2023-07-07 PROCEDURE — 82306 VITAMIN D 25 HYDROXY: CPT | Performed by: FAMILY MEDICINE

## 2023-07-07 PROCEDURE — 84481 FREE ASSAY (FT-3): CPT | Performed by: FAMILY MEDICINE

## 2023-07-07 PROCEDURE — 80050 GENERAL HEALTH PANEL: CPT | Performed by: FAMILY MEDICINE

## 2023-07-07 PROCEDURE — 84482 T3 REVERSE: CPT | Performed by: FAMILY MEDICINE

## 2023-07-07 PROCEDURE — 86141 C-REACTIVE PROTEIN HS: CPT | Performed by: FAMILY MEDICINE

## 2023-07-11 LAB — REVERSE T3: 11.9 NG/DL

## 2023-07-13 ENCOUNTER — PATIENT MESSAGE (OUTPATIENT)
Dept: INTEGRATIVE MEDICINE | Facility: CLINIC | Age: 53
End: 2023-07-13

## 2023-08-24 ENCOUNTER — HOSPITAL ENCOUNTER (OUTPATIENT)
Dept: MAMMOGRAPHY | Facility: HOSPITAL | Age: 53
Discharge: HOME | End: 2023-08-24
Attending: SURGERY
Payer: COMMERCIAL

## 2023-08-24 DIAGNOSIS — Z12.31 SCREENING MAMMOGRAM, ENCOUNTER FOR: ICD-10-CM

## 2023-08-24 PROCEDURE — 77067 SCR MAMMO BI INCL CAD: CPT | Performed by: SURGERY

## 2023-08-24 PROCEDURE — 77063 BREAST TOMOSYNTHESIS BI: CPT | Performed by: SURGERY

## 2023-08-26 NOTE — PROGRESS NOTES
[facility] - Progress Note    Today's Date and Time: 8/26/2023, 8:28 AM    Impression :   Acute confusion  - better  Recent hx MRSA UTI on 7/27/23-Treated with Macrobid  Urinary retention  Hypokalemia  GERD  Anxiety  Parkinson's    Recommendations: All cx are neg - keep off AB  Confusion improving  Ok for DC    Medical Decision Making/Summary/Discussion:8/26/2023       Infection Control Recommendations   [place] Precautions  Contact Precautions for MRSA hx    Antimicrobial Stewardship Recommendations     Discontinuation of therapy    Chief complaint/reason for consultation:   MRSA UTI      History of Present Illness:   Arabella Leigh is a 81y.o.-year-old  female who was initially admitted on 8/10/2023. Patient seen at the request of Dr. Barbara Carreon:    This patient, who sees Dr. Deshaun Knott, with Urology, for urinary retention, and was treated for a MRSA UTI with Macrobid at the end of July 2023, presented to the ED from her nursing home with confusion x 24 hrs on 8/10/23. She has a history of anxiety that is treated with Klonopin. Her right pupil was noted to be dilated. A CT of her head was negative for any acute abnormalities. Labwork was mostly unremarkable other than hypokalemia and slightly elevated creatinine. The patient was given fluids and was administered a dose of IV Rocephin and was initiated on IV Vancomycin. Blood cultures have yielded no growth thus far and the urine culture shows no significant growth. Urology was consulted and a CT abd/pelvis was ordered. ID was consulted for patient evaluation and antibiotic recommendations. CT Head 8/11/23:  No acute intracranial abnormality. Similar senescent findings, as above. CT abdomen:  8-11-23: No acute intraabdominal process. Improving Lt side colitis. Constipation with high stool burden. CXR 8/10/23:  No acute intrathoracic process.     CURRENT EVALUATION Jason Arguello is a 49year old female. Patient presents with:  Physical: New pt/establish care      HPI:   Here today to discuss to establish care     H/o of Hashimoto's dx with having increased fatigue and brain fog.  Also notes increased weight ga change, loss of consciousness, weakness and headaches. Endo/Heme/Allergies: Negative for environmental allergies. Psychiatric/Behavioral: Negative for depression and suicidal ideas. The patient is not nervous/anxious and does not have insomnia. Occupational History     Employer: [employer]   Tobacco Use    Smoking status: Never    Smokeless tobacco: Never   Substance and Sexual Activity    Alcohol use: No     Comment: very little    Drug use: No    Sexual activity: Not on file   Other Topics Concern    Not on file   Social History Narrative    Not on file     Social Determinants of Health     Financial Resource Strain: Low Risk     Difficulty of Paying Living Expenses: Not hard at all   Food Insecurity: No Food Insecurity    Worried About [place] in the Last Year: Never true    [address] in the Last Year: Never true   Transportation Needs: Unmet Transportation Needs    Lack of Transportation (Medical): Not on file    Lack of Transportation (Non-Medical): Yes   Physical Activity: Not on file   Stress: Not on file   Social Connections: Not on file   Intimate Partner Violence: Not on file   Housing Stability: High Risk    Unable to Pay for Housing in the Last Year: Not on file    Number of Places Lived in the Last Year: Not on file    Unstable Housing in the Last Year: Yes       Family History:     Family History   Problem Relation Age of Onset    Stroke Father         Allergies:   Patient has no known allergies. Review of Systems:   Review of Systems   Constitutional:  Positive for activity change (weak - needs help walking). HENT:  Negative for congestion. Eyes:  Negative for discharge. Respiratory:  Negative for apnea. Cardiovascular:  Negative for chest pain. Gastrointestinal:  Negative for abdominal distention. Endocrine: Negative for cold intolerance. Genitourinary:  Negative for dysuria. Musculoskeletal:  Negative for arthralgias. Skin:  Negative for color change. Allergic/Immunologic: Negative for immunocompromised state. Neurological:  Negative for dizziness. Hematological:  Negative for adenopathy. Psychiatric/Behavioral:  Negative for agitation.        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   • His Single      Spouse name: Not on file      Number of children: Not on file      Years of education: Not on file      Highest education level: Not on file    Occupational History      Occupation: occupational therapist        Comment: [address] [place] [address] (cpt=19281)  12/2019    ADH   • Other surgical history  2006    septoplasty   • Other surgical history N/A 4/22/2016    Procedure: HYSTEROSCOPY ENDOMETRIAL ABLATION;  Surgeon: Dorothy Curran MD;  Location: [address] [place] [address] OR   • Total abdom hysterectomy  03/2 09/27/2021   Component Date Value Ref Range Status   • SARS-CoV-2 ([facility]) 09/27/2021 Not Detected  Not Detected Final    Comment:  This test is intended for the qualitative detection of nucleic acid from the SARS-CoV-2 viral RNA from individuals who are s follow up: 1, 2, 3 and 5 years. TR4:  4-6 points   moderately suspicious Greater than or equal to 1.0cm follow-up, greater than or equal to 1.5cm FNA, follow up: 1, 2, 3 and 5 years.   TR5: 7 or more points highly suspicious Greater than or equal to 0.5cm vaginal dryness and pain with sex reported. Will discuss with oncologist if she is a candidate for E3 vaginal suppository otherwise will use vitamin E suppository. History of hyperlipidemia post surgically induced menopause.   Labs from 3 months ago stanley Administration. Dietary products are not intended to treat, prevent, mitigate or cure disease. Ultimately, you must draw your own conclusion as to the efficacy of the Product and immediately stop use of the Products if a negative reaction should arise.   Luis Meehan sweats. · Mood swings are another effect of low estrogen. You may feel sad, anxious, or frustrated. Shifting hormone levels and night sweats may disrupt your sleep. This can cause fatigue, which may make mood swings worse.   · Thinning tissues may cause di months (around 2/22/2022) for OK sda OR FRIDAY FOLLOW VIRTUAL . Patient affirmed understanding of plan and all questions were answered.      Alisha Lawson DO

## 2023-08-28 ENCOUNTER — OFFICE VISIT (OUTPATIENT)
Facility: LOCATION | Age: 53
End: 2023-08-28
Payer: COMMERCIAL

## 2023-08-28 DIAGNOSIS — N60.91 ATYPICAL DUCTAL HYPERPLASIA OF RIGHT BREAST: Primary | ICD-10-CM

## 2023-08-28 NOTE — PROGRESS NOTES
Follow Up Visit Note       Active Problems      1. Atypical ductal hyperplasia of right breast          Chief Complaint   Patient presents with:  Establish Care: EP - MAMMOGRAM RESULTS F/U, [facility] EMMA 2D+3D SCREENING BILAT 8/24, NO SYMPTOMS. History of Present Illness  Gianna Lane is a 47year old female who underwent a right breast lumpectomy for atypical ductal hyperplasia on December 10, 2019. Her final pathology revealed foci of atypical ductal hyperplasia. She met with Dr. Juancarlos Wood in January 2020 and declined tamoxifen therapy. She has been undergoing high risk surveillance, alternating breast MRI and mammograms. The patient underwent screening mammogram on August 24, 2023 and presents for follow-up. She has noticed no changes to her breasts. She is without complaints. Allergies  Emily Lopez is allergic to azithromycin and codeine. Past Medical / Surgical / Social / Family History    The past medical and past surgical history have been reviewed by me today.     Past Medical History:   Diagnosis Date    Abdominal pain Good allergies hurt my stomach    Anxiety     Arthritis     BACK PAIN     chronic/episodic lumbar pain since teens    Back pain     Bloating     Body piercing     Chronic low back pain     Constipation     Cyst of right breast 09/17/2015    Deep vein thrombosis ([address]     17years old from kick to leg    Disorder of thyroid     Dry eyes     Easy bruising     Esophageal reflux     Fatigue 13 years ago    Hashimotos dx    Fibroids, intramural 10/09/2015    5.3 cm fibroid    Flatulence/gas pain/belching     Food intolerance     GERD     Heart murmur     Heartburn     High cholesterol     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    left    History of cardiac murmur     Hyperlipidemia     Hypothyroidism 2010    Indigestion 20 yrs mirela    Irregular bowel habits     Malleolar fracture 07/05/2013    Nabothian cyst     Night sweats Last 2 years    Menopause    Ovarian cyst, right 10/09/2015 7 mm    Pain in joints     Pain with bowel movements 13 years  ago    Since Hashimotos dx    PONV (postoperative nausea and vomiting)     Problems with swallowing 20 years ago    Right hip pain     20y/o    Sleep disturbance     Spinal stenosis     Stress     Visual impairment     glasses for distance    Wears glasses     Weight loss Last 2 years    Purposely     Past Surgical History:   Procedure Laterality Date    ANKLE FRACTURE SURGERY Right 2014    ligament damage    COLONOSCOPY  2021    EGD      ENDOMETRIAL ABLATION      HYSTERECTOMY  2019    Re: uterine fibroid    MARY BIOPSY STEREO NODULE 1 SITE LEFT (CPT=19081)      BENIGN    MARY BIOPSY STEREO NODULE 1 SITE RIGHT (CPT=19081)      MARY BIOPSY STEREO NODULE 2 SITE BILAT (CPT=19081/13789) Left     benign    MARY BIOPSY STEREO W/CALC 1 SITE LEFT (CPT=19081)      Benign    MARY LOCALIZATION WIRE 1 SITE RIGHT (CPT=19281)  2019    ADH    OOPHORECTOMY      OTHER SURGICAL HISTORY      septoplasty    OTHER SURGICAL HISTORY N/A 2016    Procedure: HYSTEROSCOPY ENDOMETRIAL ABLATION;  Surgeon: Dilshad Fields MD;  Location: [address] [place] [address] OR    TOTAL ABDOM HYSTERECTOMY  2019    Total       The family history and social history have been reviewed by me today.     Family History   Problem Relation Age of Onset    Diabetes Father     Heart Disorder Father         CABG    Prostate Cancer Father     Hypertension Father     Lipids Father     Heart Surgery Father          on table while having TAVR     Dementia Father     Other (Other) Father     Other (Prostate Cancer) Father 61        prostate ca    Breast Cancer Mother 61    Diabetes Mother     Heart Disorder Mother         CABG    Lipids Mother     Hypertension Mother     Colon Cancer Mother 80    Dementia Mother     Depression Mother     Obesity Mother     Psychiatric Mother     Heart Disorder Maternal Grandmother     Uterine Cancer Maternal Grandmother     Depression Maternal Grandmother     Other (Bladder Cancer) Maternal Grandmother     Heart Disorder Maternal Grandfather     Other (Other) Maternal Grandfather     Other (chf) Maternal Grandfather     Heart Disorder Paternal Grandmother     Heart Attack Brother          maker MI    Heart Disorder Brother         Death 61 heart attack    Other (Other) Brother         ETOH    Other (alcoholic) Brother     Other (gout) Brother     Other (prostritis) Brother     Other (alcoholism) Brother     Other (thyroid) Sister         poss. huntingtons    Other (irregular HR) Sister     Cancer Other     Breast Cancer Maternal Aunt         Great Aunt     Social History    Socioeconomic History      Marital status:     Occupational History      Occupation: occupational therapist        Comment: [employer]    Tobacco Use      Smoking status: Never      Smokeless tobacco: Never    Vaping Use      Vaping Use: Never used    Substance and Sexual Activity      Alcohol use: No      Drug use: No      Sexual activity: Yes        Partners: Male        Birth control/protection: Hysterectomy    Other Topics      Concerns:        Caffeine Concern: Yes        Stress Concern: Yes          Anxiety        Weight Concern: Yes          Struggle with it        Special Diet: Yes          GF and organic 75%        Exercise: Yes        Seat Belt: Yes       Current Outpatient Medications:     CUSTOM MEDICATION, T4 75 mcg / T3 15  mcg  Take 1 cap in morning daily on empty stomach, Disp: 90 each, Rfl: 1    TIROSINT 50 MCG Oral Cap, Take 1 tablet by mouth before breakfast., Disp: 30 capsule, Rfl: 0    FLUTICASONE PROPIONATE 50 MCG/ACT Nasal Suspension, SPRAY 2 SPRAYS IN EACH NOSTRIL EVERY DAY, Disp: 16 g, Rfl: 0    Cholecalciferol (VITAMIN D) 50 MCG (2000 UT) Oral Tab, Take 1 tablet by mouth daily. , Disp: , Rfl:     Pantoprazole Sodium 40 MG Oral Tab EC, Take 1 tablet (40 mg total) by mouth 2 (two) times daily before meals. , Disp: 180 tablet, Rfl: 0 omega-3 fatty acids 1000 MG Oral Cap, Take 1,000 mg by mouth daily. , Disp: , Rfl:     Coenzyme Q10 (COQ-10 OR), Take 1 tablet by mouth daily. , Disp: 30 capsule, Rfl: 0    MAGNESIUM OR, Take by mouth See Admin Instructions. Take 3-5 tablets by mouth nightly. , Disp: 30 capsule, Rfl: 0     Review of Systems  The Review of Systems has been reviewed by me during today. Review of Systems   Constitutional: Negative. HENT: Negative. Eyes: Negative. Respiratory: Negative. Cardiovascular: Negative. Gastrointestinal: Negative. Genitourinary: Negative. Musculoskeletal: Negative. Skin: Negative. Neurological: Negative. Psychiatric/Behavioral: Negative. Physical Findings   LMP 02/07/2019   Physical Exam  Vitals and nursing note reviewed. Constitutional:       General: She is not in acute distress. Appearance: She is well-developed. She is not diaphoretic. HENT:      Head: Normocephalic and atraumatic. Eyes:      General: No scleral icterus. Conjunctiva/sclera: Conjunctivae normal.      Pupils: Pupils are equal, round, and reactive to light. Chest:      Chest wall: No mass. Breasts:     Right: No inverted nipple, mass, nipple discharge, skin change or tenderness. Left: No inverted nipple, mass, nipple discharge, skin change or tenderness. Comments: Well-healed right breast scar  Lymphadenopathy:      Upper Body:      Right upper body: No axillary or pectoral adenopathy. Left upper body: No axillary or pectoral adenopathy. Neurological:      Mental Status: She is alert and oriented to person, place, and time. Psychiatric:         Speech: Speech normal.         Behavior: Behavior normal.       MAMMOGRAM   I personally viewed the films and agree with the radiologist's interpretation. FINDINGS:  No suspicious masses or microcalcifications are noted. Impression   CONCLUSION:  No mammographic evidence of malignancy.      BI-RADS CATEGORY:    DIAGNOSTIC CATEGORY 1--NEGATIVE ASSESSMENT. RECOMMENDATIONS:    ROUTINE MAMMOGRAM AND CLINICAL EVALUATION IN 12 MONTHS. Because of breast density, this patient may benefit from supplemental screening with breast MRI, Molecular Breast Imaging (MBI) or bilateral whole breast ultrasound for increased sensitivity for detection of cancer which can be obscured mammographically. Please contact your ordering provider to discuss supplemental screening options. A letter explaining the results in lay terms has been sent to the patient. This exam was evaluated with a computer-aided device. This patient's information has been entered into a reminder system with a target due date for the next mammogram.        LOCATION:  [facility]        Dictated by (CST): Alex Fried MD on 8/24/2023        Assessment   Atypical ductal hyperplasia of right breast  (primary encounter diagnosis)    Plan   The patient has a benign mammogram and exam.  We will continue high risk screening, with an MRI in 6 months. The order was placed at today's visit. She should return to my attention with any changes to her breasts.        Betsy Cabrales MD

## 2023-08-29 ENCOUNTER — PATIENT MESSAGE (OUTPATIENT)
Dept: INTEGRATIVE MEDICINE | Facility: CLINIC | Age: 53
End: 2023-08-29

## 2023-08-29 DIAGNOSIS — K21.00 GASTROESOPHAGEAL REFLUX DISEASE WITH ESOPHAGITIS: ICD-10-CM

## 2023-08-29 RX ORDER — PANTOPRAZOLE SODIUM 40 MG/1
40 TABLET, DELAYED RELEASE ORAL
Qty: 30 TABLET | Refills: 0 | Status: SHIPPED | OUTPATIENT
Start: 2023-08-29

## 2023-08-29 RX ORDER — FLUTICASONE PROPIONATE 50 MCG
2 SPRAY, SUSPENSION (ML) NASAL DAILY
Qty: 16 G | Refills: 0 | Status: SHIPPED | OUTPATIENT
Start: 2023-08-29

## 2023-09-07 ENCOUNTER — PATIENT MESSAGE (OUTPATIENT)
Dept: INTEGRATIVE MEDICINE | Facility: CLINIC | Age: 53
End: 2023-09-07

## 2023-09-07 DIAGNOSIS — E03.9 HYPOTHYROIDISM, UNSPECIFIED TYPE: Primary | ICD-10-CM

## 2023-09-07 NOTE — TELEPHONE ENCOUNTER
A refill request was received for:  Requested Prescriptions     Pending Prescriptions Disp Refills    CUSTOM MEDICATION 90 each 1     Sig: T4 75 mcg / T3 15  mcg   Take 1 cap in morning daily on empty stomach     Last refill date:  3/9/23  Qty: 90, refill 1  Dx: Hypothyroidism  Last office visit: 5/1/23  When is follow up due: 3 months (8/1/23)    For hormone prescriptions, date of last blood test for rx being requested:7/7/23    No future appointments. [facility] sent to schedule an appointment. T4 75 mcg / T3 15  mcg, # 90; authorize if appropriate.

## 2023-09-08 NOTE — TELEPHONE ENCOUNTER
A refill request was received for:  Requested Prescriptions     Pending Prescriptions Disp Refills    CUSTOM MEDICATION 90 each 1     Sig: T4 75 mcg / T3 15  mcg   Take 1 cap in morning daily on empty stomach     Last refill date:  3/9/23   Qty: 90 each 1 refill   Dx: hypothyroidism  Last office visit: 5/1/23  When is follow up due: 8/1/23      Future Appointments   Date Time Provider Jayy Sanabria   1/10/2024  9:30 AM Melecio Tilley DO [address] [facility] INT MED [address] [place] [address]

## 2023-10-26 ENCOUNTER — OFFICE VISIT (OUTPATIENT)
Dept: SURGERY | Facility: CLINIC | Age: 53
End: 2023-10-26

## 2023-10-26 VITALS
HEIGHT: 68.4 IN | BODY MASS INDEX: 32.99 KG/M2 | HEART RATE: 65 BPM | WEIGHT: 220.19 LBS | DIASTOLIC BLOOD PRESSURE: 75 MMHG | SYSTOLIC BLOOD PRESSURE: 128 MMHG | OXYGEN SATURATION: 96 %

## 2023-10-26 DIAGNOSIS — F43.9 STRESS: ICD-10-CM

## 2023-10-26 DIAGNOSIS — E78.5 DYSLIPIDEMIA: ICD-10-CM

## 2023-10-26 DIAGNOSIS — R63.2 BINGE EATING: Primary | ICD-10-CM

## 2023-10-26 DIAGNOSIS — E66.9 OBESITY (BMI 30-39.9): ICD-10-CM

## 2023-10-26 PROCEDURE — 3074F SYST BP LT 130 MM HG: CPT | Performed by: INTERNAL MEDICINE

## 2023-10-26 PROCEDURE — 3078F DIAST BP <80 MM HG: CPT | Performed by: INTERNAL MEDICINE

## 2023-10-26 PROCEDURE — 99214 OFFICE O/P EST MOD 30 MIN: CPT | Performed by: INTERNAL MEDICINE

## 2023-10-26 PROCEDURE — 3008F BODY MASS INDEX DOCD: CPT | Performed by: INTERNAL MEDICINE

## 2023-10-26 RX ORDER — ESCITALOPRAM OXALATE 5 MG/1
5 TABLET ORAL DAILY
Qty: 90 TABLET | Refills: 1 | Status: SHIPPED | OUTPATIENT
Start: 2023-10-26 | End: 2024-01-24

## 2023-10-27 ENCOUNTER — OFFICE VISIT (OUTPATIENT)
Dept: FAMILY MEDICINE CLINIC | Facility: CLINIC | Age: 53
End: 2023-10-27

## 2023-10-27 VITALS
DIASTOLIC BLOOD PRESSURE: 82 MMHG | WEIGHT: 220 LBS | HEART RATE: 90 BPM | TEMPERATURE: 98 F | SYSTOLIC BLOOD PRESSURE: 135 MMHG | OXYGEN SATURATION: 98 % | BODY MASS INDEX: 32.58 KG/M2 | HEIGHT: 69 IN | RESPIRATION RATE: 18 BRPM

## 2023-10-27 DIAGNOSIS — J01.90 ACUTE NON-RECURRENT SINUSITIS, UNSPECIFIED LOCATION: Primary | ICD-10-CM

## 2023-10-27 DIAGNOSIS — J30.89 SEASONAL ALLERGIC RHINITIS DUE TO OTHER ALLERGIC TRIGGER: ICD-10-CM

## 2023-10-27 DIAGNOSIS — R63.2 BINGE EATING: Primary | ICD-10-CM

## 2023-10-27 DIAGNOSIS — R05.2 SUBACUTE COUGH: ICD-10-CM

## 2023-10-27 PROCEDURE — 3079F DIAST BP 80-89 MM HG: CPT | Performed by: PHYSICIAN ASSISTANT

## 2023-10-27 PROCEDURE — 99213 OFFICE O/P EST LOW 20 MIN: CPT | Performed by: PHYSICIAN ASSISTANT

## 2023-10-27 PROCEDURE — 3008F BODY MASS INDEX DOCD: CPT | Performed by: PHYSICIAN ASSISTANT

## 2023-10-27 PROCEDURE — 3075F SYST BP GE 130 - 139MM HG: CPT | Performed by: PHYSICIAN ASSISTANT

## 2023-10-27 RX ORDER — ALBUTEROL SULFATE 90 UG/1
2 AEROSOL, METERED RESPIRATORY (INHALATION) EVERY 6 HOURS PRN
Qty: 1 EACH | Refills: 0 | Status: SHIPPED | OUTPATIENT
Start: 2023-10-27

## 2023-10-27 RX ORDER — LISDEXAMFETAMINE DIMESYLATE CAPSULES 30 MG/1
30 CAPSULE ORAL DAILY
Qty: 30 CAPSULE | Refills: 0 | Status: SHIPPED | OUTPATIENT
Start: 2023-11-27 | End: 2023-12-27

## 2023-10-27 RX ORDER — DOXYCYCLINE HYCLATE 100 MG/1
100 CAPSULE ORAL 2 TIMES DAILY
Qty: 20 CAPSULE | Refills: 0 | Status: SHIPPED | OUTPATIENT
Start: 2023-10-27 | End: 2023-11-06

## 2023-10-27 RX ORDER — LISDEXAMFETAMINE DIMESYLATE CAPSULES 30 MG/1
30 CAPSULE ORAL DAILY
Qty: 30 CAPSULE | Refills: 0 | Status: SHIPPED | OUTPATIENT
Start: 2023-12-28 | End: 2024-01-27

## 2023-10-27 RX ORDER — METHYLPREDNISOLONE 4 MG/1
TABLET ORAL
Qty: 1 EACH | Refills: 0 | Status: SHIPPED | OUTPATIENT
Start: 2023-10-27

## 2023-10-27 RX ORDER — LISDEXAMFETAMINE DIMESYLATE CAPSULES 30 MG/1
30 CAPSULE ORAL DAILY
Qty: 30 CAPSULE | Refills: 0 | Status: SHIPPED | OUTPATIENT
Start: 2023-10-27 | End: 2023-11-26

## 2023-10-27 NOTE — PATIENT INSTRUCTIONS
Doxycyline 100 mg twice daily for 10 days. Medrol dose pack (steroid) instructions on packaging. Both of the above medications lower threshold for sunburn--use appropriate sun protection with sun exposure. Albuterol inhaler 2 puffs inhaled every 4 to 6 hours as needed. Encourage fluids, humidifier/vaporizor at bedside, elevate head of bed (sleep with extra pillow), vapor rub to chest, steam therapy if no fever, warm compresses for sinus pressure if no fever, salt water gargles for sore throat, lozenges for sore throat, may try over the counter saline nasal spray or irrigation kit (use distilled water with irrigation kit) for sinus pressure/congestion, get plenty of rest.    Follow up with your primary care provider if symptoms fail to resolve as anticipated or go to the immediate care or ER in event of new/worsening symptoms at any time.

## 2023-11-19 DIAGNOSIS — K21.00 GASTROESOPHAGEAL REFLUX DISEASE WITH ESOPHAGITIS: ICD-10-CM

## 2023-11-20 RX ORDER — FLUTICASONE PROPIONATE 50 MCG
2 SPRAY, SUSPENSION (ML) NASAL DAILY
Qty: 16 G | Refills: 0 | Status: SHIPPED | OUTPATIENT
Start: 2023-11-20

## 2023-11-20 RX ORDER — PANTOPRAZOLE SODIUM 40 MG/1
40 TABLET, DELAYED RELEASE ORAL
Qty: 30 TABLET | Refills: 0 | Status: SHIPPED | OUTPATIENT
Start: 2023-11-20

## 2023-11-20 NOTE — TELEPHONE ENCOUNTER
Allergy Medication Protocol [id number]/19/2023 11:37 AM    Appointment in the past 12 or next 3 months

## 2023-11-20 NOTE — TELEPHONE ENCOUNTER
A refill request was received for:  Requested Prescriptions     Pending Prescriptions Disp Refills    pantoprazole 40 MG Oral Tab EC 30 tablet 0     Sig: Take 1 tablet (40 mg total) by mouth every morning before breakfast.     Last refill date:  5/1/23   Qty: 30 and 0   Dx: gerd   Last office visit: 5/2023   When is follow up due: now    Future Appointments   Date Time Provider Jayy Sanabria   12/13/2023 10:00 AM August [facility]   2/12/2024  4:45 PM Tete Jones MD [address]

## 2023-11-22 ENCOUNTER — TELEPHONE (OUTPATIENT)
Dept: SURGERY | Facility: CLINIC | Age: 53
End: 2023-11-22

## 2023-12-13 ENCOUNTER — OFFICE VISIT (OUTPATIENT)
Dept: INTEGRATIVE MEDICINE | Facility: CLINIC | Age: 53
End: 2023-12-13
Payer: COMMERCIAL

## 2023-12-13 VITALS
WEIGHT: 219.81 LBS | BODY MASS INDEX: 32.56 KG/M2 | HEIGHT: 69 IN | SYSTOLIC BLOOD PRESSURE: 110 MMHG | DIASTOLIC BLOOD PRESSURE: 68 MMHG | HEART RATE: 75 BPM | OXYGEN SATURATION: 99 %

## 2023-12-13 DIAGNOSIS — Z78.0 MENOPAUSE: ICD-10-CM

## 2023-12-13 DIAGNOSIS — R53.83 OTHER FATIGUE: Primary | ICD-10-CM

## 2023-12-13 DIAGNOSIS — E03.9 HYPOTHYROIDISM, UNSPECIFIED TYPE: ICD-10-CM

## 2023-12-13 DIAGNOSIS — F41.9 ANXIETY: ICD-10-CM

## 2023-12-13 DIAGNOSIS — E66.9 CLASS 1 OBESITY WITHOUT SERIOUS COMORBIDITY WITH BODY MASS INDEX (BMI) OF 32.0 TO 32.9 IN ADULT, UNSPECIFIED OBESITY TYPE: ICD-10-CM

## 2023-12-13 DIAGNOSIS — F43.9 STRESS: ICD-10-CM

## 2023-12-13 PROCEDURE — 99215 OFFICE O/P EST HI 40 MIN: CPT | Performed by: PHYSICIAN ASSISTANT

## 2023-12-13 PROCEDURE — 3008F BODY MASS INDEX DOCD: CPT | Performed by: PHYSICIAN ASSISTANT

## 2023-12-13 PROCEDURE — 3078F DIAST BP <80 MM HG: CPT | Performed by: PHYSICIAN ASSISTANT

## 2023-12-13 PROCEDURE — 3074F SYST BP LT 130 MM HG: CPT | Performed by: PHYSICIAN ASSISTANT

## 2023-12-13 NOTE — PATIENT INSTRUCTIONS
1) get blood work fasting    2) Start lexapro    3) hold cortisol Eaze - may start ashwagadha at 500-1000 twice a day     4) email from [address] [place], [place] [address] test    5) counselor about ways to set boundaries   6) deep breathing box breathing twice a day in the shower and before falling asleep 30-1 minute.      7) limit the extremely inflammatory things     8) consider restarting acupuncture

## 2023-12-16 ENCOUNTER — LAB ENCOUNTER (OUTPATIENT)
Dept: LAB | Facility: REFERENCE LAB | Age: 53
End: 2023-12-16
Attending: PHYSICIAN ASSISTANT
Payer: COMMERCIAL

## 2023-12-16 DIAGNOSIS — E66.9 CLASS 1 OBESITY WITHOUT SERIOUS COMORBIDITY WITH BODY MASS INDEX (BMI) OF 32.0 TO 32.9 IN ADULT, UNSPECIFIED OBESITY TYPE: ICD-10-CM

## 2023-12-16 DIAGNOSIS — R53.83 OTHER FATIGUE: ICD-10-CM

## 2023-12-16 DIAGNOSIS — E03.9 HYPOTHYROIDISM, UNSPECIFIED TYPE: ICD-10-CM

## 2023-12-16 DIAGNOSIS — F41.9 ANXIETY: ICD-10-CM

## 2023-12-16 DIAGNOSIS — Z78.0 MENOPAUSE: ICD-10-CM

## 2023-12-16 DIAGNOSIS — F43.9 STRESS: ICD-10-CM

## 2023-12-16 LAB
ALBUMIN SERPL-MCNC: 4.8 G/DL (ref 3.2–4.8)
ALBUMIN/GLOB SERPL: 1.7 {RATIO} (ref 1–2)
ALP LIVER SERPL-CCNC: 81 U/L
ALT SERPL-CCNC: 20 U/L
ANION GAP SERPL CALC-SCNC: 6 MMOL/L (ref 0–18)
AST SERPL-CCNC: 18 U/L (ref ?–34)
BASOPHILS # BLD AUTO: 0.02 X10(3) UL (ref 0–0.2)
BASOPHILS NFR BLD AUTO: 0.8 %
BILIRUB SERPL-MCNC: 0.5 MG/DL (ref 0.3–1.2)
BUN BLD-MCNC: 14 MG/DL (ref 9–23)
BUN/CREAT SERPL: 17.5 (ref 10–20)
CALCIUM BLD-MCNC: 9.7 MG/DL (ref 8.7–10.4)
CHLORIDE SERPL-SCNC: 103 MMOL/L (ref 98–112)
CO2 SERPL-SCNC: 28 MMOL/L (ref 21–32)
CORTIS SERPL-MCNC: 19.2 UG/DL
CREAT BLD-MCNC: 0.8 MG/DL
DEPRECATED RDW RBC AUTO: 40.7 FL (ref 35.1–46.3)
DHEA-S SERPL-MCNC: 88.2 UG/DL
EGFRCR SERPLBLD CKD-EPI 2021: 89 ML/MIN/1.73M2 (ref 60–?)
EOSINOPHIL # BLD AUTO: 0.03 X10(3) UL (ref 0–0.7)
EOSINOPHIL NFR BLD AUTO: 1.2 %
ERYTHROCYTE [DISTWIDTH] IN BLOOD BY AUTOMATED COUNT: 12.6 % (ref 11–15)
ESTRADIOL SERPL-MCNC: 19.6 PG/ML
FASTING STATUS PATIENT QL REPORTED: YES
FSH SERPL-ACNC: 80.6 MIU/ML
GLOBULIN PLAS-MCNC: 2.9 G/DL (ref 2.8–4.4)
GLUCOSE BLD-MCNC: 95 MG/DL (ref 70–99)
HCT VFR BLD AUTO: 44.2 %
HGB BLD-MCNC: 14.6 G/DL
IMM GRANULOCYTES # BLD AUTO: 0 X10(3) UL (ref 0–1)
IMM GRANULOCYTES NFR BLD: 0 %
INSULIN SERPL-ACNC: 12.4 MU/L (ref 3–25)
LH SERPL-ACNC: 27.6 MIU/ML
LYMPHOCYTES # BLD AUTO: 0.77 X10(3) UL (ref 1–4)
LYMPHOCYTES NFR BLD AUTO: 30 %
MCH RBC QN AUTO: 29 PG (ref 26–34)
MCHC RBC AUTO-ENTMCNC: 33 G/DL (ref 31–37)
MCV RBC AUTO: 87.9 FL
MONOCYTES # BLD AUTO: 0.4 X10(3) UL (ref 0.1–1)
MONOCYTES NFR BLD AUTO: 15.6 %
NEUTROPHILS # BLD AUTO: 1.35 X10 (3) UL (ref 1.5–7.7)
NEUTROPHILS # BLD AUTO: 1.35 X10(3) UL (ref 1.5–7.7)
NEUTROPHILS NFR BLD AUTO: 52.4 %
OSMOLALITY SERPL CALC.SUM OF ELEC: 284 MOSM/KG (ref 275–295)
PLATELET # BLD AUTO: 213 10(3)UL (ref 150–450)
POTASSIUM SERPL-SCNC: 4 MMOL/L (ref 3.5–5.1)
PROGEST SERPL-MCNC: 0.28 NG/ML
PROLACTIN SERPL-MCNC: 4.4 NG/ML
PROT SERPL-MCNC: 7.7 G/DL (ref 5.7–8.2)
RBC # BLD AUTO: 5.03 X10(6)UL
SODIUM SERPL-SCNC: 137 MMOL/L (ref 136–145)
T3FREE SERPL-MCNC: 2.88 PG/ML (ref 2.4–4.2)
T4 FREE SERPL-MCNC: 1 NG/DL (ref 0.8–1.7)
THYROGLOB SERPL-MCNC: 156 U/ML (ref ?–60)
THYROPEROXIDASE AB SERPL-ACNC: 743 U/ML (ref ?–60)
TSI SER-ACNC: 0.89 MIU/ML (ref 0.55–4.78)
VIT D+METAB SERPL-MCNC: 38.2 NG/ML (ref 30–100)
WBC # BLD AUTO: 2.6 X10(3) UL (ref 4–11)

## 2023-12-16 PROCEDURE — 82670 ASSAY OF TOTAL ESTRADIOL: CPT | Performed by: PHYSICIAN ASSISTANT

## 2023-12-16 PROCEDURE — 82306 VITAMIN D 25 HYDROXY: CPT | Performed by: PHYSICIAN ASSISTANT

## 2023-12-16 PROCEDURE — 84144 ASSAY OF PROGESTERONE: CPT | Performed by: PHYSICIAN ASSISTANT

## 2023-12-16 PROCEDURE — 84410 TESTOSTERONE BIOAVAILABLE: CPT | Performed by: PHYSICIAN ASSISTANT

## 2023-12-16 PROCEDURE — 82627 DEHYDROEPIANDROSTERONE: CPT | Performed by: PHYSICIAN ASSISTANT

## 2023-12-16 PROCEDURE — 83525 ASSAY OF INSULIN: CPT | Performed by: PHYSICIAN ASSISTANT

## 2023-12-16 PROCEDURE — 83001 ASSAY OF GONADOTROPIN (FSH): CPT | Performed by: PHYSICIAN ASSISTANT

## 2023-12-16 PROCEDURE — 84439 ASSAY OF FREE THYROXINE: CPT | Performed by: PHYSICIAN ASSISTANT

## 2023-12-16 PROCEDURE — 84140 ASSAY OF PREGNENOLONE: CPT | Performed by: PHYSICIAN ASSISTANT

## 2023-12-16 PROCEDURE — 84481 FREE ASSAY (FT-3): CPT | Performed by: PHYSICIAN ASSISTANT

## 2023-12-16 PROCEDURE — 82679 ASSAY OF ESTRONE: CPT | Performed by: PHYSICIAN ASSISTANT

## 2023-12-16 PROCEDURE — 86800 THYROGLOBULIN ANTIBODY: CPT | Performed by: PHYSICIAN ASSISTANT

## 2023-12-16 PROCEDURE — 86376 MICROSOMAL ANTIBODY EACH: CPT | Performed by: PHYSICIAN ASSISTANT

## 2023-12-16 PROCEDURE — 84146 ASSAY OF PROLACTIN: CPT | Performed by: PHYSICIAN ASSISTANT

## 2023-12-16 PROCEDURE — 82533 TOTAL CORTISOL: CPT | Performed by: PHYSICIAN ASSISTANT

## 2023-12-16 PROCEDURE — 83002 ASSAY OF GONADOTROPIN (LH): CPT | Performed by: PHYSICIAN ASSISTANT

## 2023-12-16 PROCEDURE — 80050 GENERAL HEALTH PANEL: CPT | Performed by: PHYSICIAN ASSISTANT

## 2023-12-16 PROCEDURE — 84482 T3 REVERSE: CPT | Performed by: PHYSICIAN ASSISTANT

## 2023-12-18 ENCOUNTER — NURSE TRIAGE (OUTPATIENT)
Dept: FAMILY MEDICINE CLINIC | Facility: CLINIC | Age: 53
End: 2023-12-18

## 2023-12-18 NOTE — TELEPHONE ENCOUNTER
S/w Zee Nadya stated dry constant cough returned on 12/11/23 with back chest discomfort only with coughing due to costochondritis diagnosed prior visit. Pt reports intermittent mild wheezing. No providers in the office today and the pt denied to go to . Pt informed [facility] is no longer performing primary care. Scheduled with Dr. Shantelle Cronejo on 12/19/23 at 12 noon. Pt advised to proceed to the ED if develop chest pain un-related to coughing, increased shortness of breath or wheezing or fever > 102 F. Pt voiced understanding. Reason for Disposition   MILD difficulty breathing (e.g., minimal/no SOB at rest, SOB with walking, pulse <100) and still present when not coughing    Answer Assessment - Initial Assessment Questions  1. ONSET: \"When did the cough begin? \"       12/11/23  2. SEVERITY: \"How bad is the cough today? \"       Constant  3. SPUTUM: \"Describe the color of your sputum\" (none, dry cough; clear, white, yellow, green)      Denied  4. HEMOPTYSIS: \"Are you coughing up any blood? \" If so ask: \"How much? \" (flecks, streaks, tablespoons, etc.)      Denied  5. DIFFICULTY BREATHING: Voncille Marvin you having difficulty breathing? \" If Yes, ask: \"How bad is it? \" (e.g., mild, moderate, severe)     - MILD: No SOB at rest, mild SOB with walking, speaks normally in sentences, can lie down, no retractions, pulse < 100.     - MODERATE: SOB at rest, SOB with minimal exertion and prefers to sit, cannot lie down flat, speaks in phrases, mild retractions, audible wheezing, pulse 100-120.     - SEVERE: Very SOB at rest, speaks in single words, struggling to breathe, sitting hunched forward, retractions, pulse > 120       Wheezing has returned. Albuterol inhaler ran out. Albuterol 6-8 times a day. 6. FEVER: \"Do you have a fever? \" If Yes, ask: \"What is your temperature, how was it measured, and when did it start? \"      Denied ( last Monday felt like had a fever).    7. CARDIAC HISTORY: \"Do you have any history of heart disease? \" (e.g., heart attack, congestive heart failure)       Denied  8. LUNG HISTORY: \"Do you have any history of lung disease? \"  (e.g., pulmonary embolus, asthma, emphysema)      Denied  9. PE RISK FACTORS: \"Do you have a history of blood clots? \" (or: recent major surgery, recent prolonged travel, bedridden)      Denied  10. OTHER SYMPTOMS: \"Do you have any other symptoms? \" (e.g., runny nose, wheezing, chest pain)        Mild wheezing when supine. 11. PREGNANCY: \"Is there any chance you are pregnant? \" \"When was your last menstrual period? \"        N/A  12. TRAVEL: \"Have you traveled out of the country in the last month? \" (e.g., travel history, exposures)        Denied    Protocols used: [id number]

## 2023-12-19 ENCOUNTER — OFFICE VISIT (OUTPATIENT)
Dept: FAMILY MEDICINE CLINIC | Facility: CLINIC | Age: 53
End: 2023-12-19
Payer: COMMERCIAL

## 2023-12-19 DIAGNOSIS — R09.82 PND (POST-NASAL DRIP): ICD-10-CM

## 2023-12-19 DIAGNOSIS — R05.3 CHRONIC COUGH: ICD-10-CM

## 2023-12-19 DIAGNOSIS — K21.9 GASTROESOPHAGEAL REFLUX DISEASE WITHOUT ESOPHAGITIS: ICD-10-CM

## 2023-12-19 DIAGNOSIS — D72.819 LEUKOPENIA, UNSPECIFIED TYPE: ICD-10-CM

## 2023-12-19 DIAGNOSIS — R05.1 ACUTE COUGH: Primary | ICD-10-CM

## 2023-12-19 PROBLEM — J02.9 PHARYNGITIS: Status: ACTIVE | Noted: 2022-11-21

## 2023-12-19 PROBLEM — D35.1 PARATHYROID ADENOMA: Status: ACTIVE | Noted: 2023-12-19

## 2023-12-19 LAB — ESTRONE: 11 PG/ML

## 2023-12-19 PROCEDURE — 99214 OFFICE O/P EST MOD 30 MIN: CPT | Performed by: STUDENT IN AN ORGANIZED HEALTH CARE EDUCATION/TRAINING PROGRAM

## 2023-12-19 RX ORDER — PREDNISONE 20 MG/1
20 TABLET ORAL DAILY
Qty: 5 TABLET | Refills: 0 | Status: SHIPPED | OUTPATIENT
Start: 2023-12-19 | End: 2023-12-24

## 2023-12-19 RX ORDER — ALBUTEROL SULFATE 90 UG/1
2 AEROSOL, METERED RESPIRATORY (INHALATION) EVERY 6 HOURS PRN
Qty: 1 EACH | Refills: 1 | Status: SHIPPED | OUTPATIENT
Start: 2023-12-19

## 2023-12-19 RX ORDER — BENZONATATE 200 MG/1
200 CAPSULE ORAL 3 TIMES DAILY PRN
Qty: 20 CAPSULE | Refills: 0 | Status: SHIPPED | OUTPATIENT
Start: 2023-12-19

## 2023-12-20 ENCOUNTER — PATIENT MESSAGE (OUTPATIENT)
Dept: FAMILY MEDICINE CLINIC | Facility: CLINIC | Age: 53
End: 2023-12-20

## 2023-12-20 DIAGNOSIS — K21.00 GASTROESOPHAGEAL REFLUX DISEASE WITH ESOPHAGITIS: ICD-10-CM

## 2023-12-20 RX ORDER — PANTOPRAZOLE SODIUM 40 MG/1
40 TABLET, DELAYED RELEASE ORAL
Qty: 30 TABLET | Refills: 0 | Status: SHIPPED | OUTPATIENT
Start: 2023-12-20

## 2023-12-21 LAB
REVERSE T3: 12.3 NG/DL
SEX HORM BIND GLOB: 42 NMOL/L
TESTOST % FREE+WEAK BND: 13.6 %
TESTOST FREE+WEAK BND: 1.5 NG/DL
TESTOSTERONE TOT /MS: 10.8 NG/DL

## 2023-12-24 LAB — PREGNENOLONE: 21 NG/DL

## 2024-01-16 ENCOUNTER — TELEMEDICINE (OUTPATIENT)
Dept: INTEGRATIVE MEDICINE | Facility: CLINIC | Age: 54
End: 2024-01-16
Payer: COMMERCIAL

## 2024-01-16 DIAGNOSIS — E03.8 HYPOTHYROIDISM DUE TO HASHIMOTO'S THYROIDITIS: ICD-10-CM

## 2024-01-16 DIAGNOSIS — F41.9 ANXIETY: ICD-10-CM

## 2024-01-16 DIAGNOSIS — E06.3 HYPOTHYROIDISM DUE TO HASHIMOTO'S THYROIDITIS: ICD-10-CM

## 2024-01-16 DIAGNOSIS — K21.9 GASTROESOPHAGEAL REFLUX DISEASE, UNSPECIFIED WHETHER ESOPHAGITIS PRESENT: ICD-10-CM

## 2024-01-16 DIAGNOSIS — Z78.0 MENOPAUSE: ICD-10-CM

## 2024-01-16 DIAGNOSIS — E78.5 DYSLIPIDEMIA: Primary | ICD-10-CM

## 2024-01-16 DIAGNOSIS — E66.3 OVERWEIGHT (BMI 25.0-29.9): ICD-10-CM

## 2024-01-16 DIAGNOSIS — K21.00 GASTROESOPHAGEAL REFLUX DISEASE WITH ESOPHAGITIS: ICD-10-CM

## 2024-01-16 DIAGNOSIS — E88.819 INSULIN RESISTANCE: ICD-10-CM

## 2024-01-16 PROCEDURE — 99215 OFFICE O/P EST HI 40 MIN: CPT | Performed by: PHYSICIAN ASSISTANT

## 2024-01-16 RX ORDER — ESCITALOPRAM OXALATE 10 MG/1
10 TABLET ORAL DAILY
Qty: 90 TABLET | Refills: 0 | Status: SHIPPED | OUTPATIENT
Start: 2024-01-16 | End: 2024-04-15

## 2024-01-16 RX ORDER — PANTOPRAZOLE SODIUM 40 MG/1
40 TABLET, DELAYED RELEASE ORAL
Qty: 90 TABLET | Refills: 0 | Status: SHIPPED | OUTPATIENT
Start: 2024-01-16 | End: 2024-04-15

## 2024-01-16 NOTE — PATIENT INSTRUCTIONS
Pregnenolone - 10mg daily   Verify with breast specialist they are okay with this   We will wait on this until we get cortisol and insulin under control as contributing factors of brain fog     Brain Fog can be associated with   Pregnenolone being low  Insulin resistance   High cortisol   High stress  Low estrogen and progesterone     Lexapro increase to 10mg (which two 5mg tabs)   Be on lexapro for 10 days then start Vyvanse     Switch to a B complex when you finish B12     Digestive enzyme - take three times a day with every meal     Insulin - If we are hitting a block with weight loss we will consider adding ALA, Berberine and/or inositol    Magnesium Citrate helps with bowel movements  Magnesium bisglycinate - helps with mood and sleep     Zinc and copper - during cold and flu and stop for summer

## 2024-01-16 NOTE — PROGRESS NOTES
Adriana Hoffmann is a 53 year old female.  No chief complaint on file.      HPI:   Adriana presents for follow up for lab test,     She has been sick for the 6 months. They had covid during the holidays. Dr. Castellano will be rechecking her CBC to make sure things normalize. She has added Vitamin C. She has also added Vitamin D. She has been on this for the last 30 days.     Hormones - She stopped the Vivelle Dot November 2019 due to pre cancerous tumor in breast.     She is getting MRI and mammogram every 6 months.     Thyroid - She feels stable. Last summer she had a lot of hair breakage. Since she went off tirosint and started compound     Lexapro- She started lexapro. She felt great the first week. She feels that she is down in the dumps again. She has been on it for 3 weeks.     Vyvanse- She has not started the Vyvanse yet. She did not want to start it to     GI - Digestive enzymes twice a day. She is on a probiotic   has her on foods with inflammation.   She is on colostrum -erick     Detox - She will work with health , rima. They cut out leptins, caffeine, dairy     She is only on protonix as needed- its causing cough, PND and acid reflex - plan is to get stop it once her diet is back and stress is down.       Lifestyle Factors affecting health:   Diet - low sugar and sodium - trigger hot flashes    Stress - Mother is struggling with her medical and mental health. She started doing a breathing when she is trying to handle her anxiety.     Her  and her were great until after her son became ill, mother they are looking for more help.   He feel that April and May things should be getting better.     She is going to start her counselor back up. She tried counseling with her  but they are able to communicate    Sleep - She does not sleep due to anxiety. When she was in the first week of lexapro her sleep improved. 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       HPI:    Adriana presents for follow up,     Last summer she put on extra weight     Hormones - hysterectomy with ovaries 2020. Kayla dot 6 months later. November lumpectomy pre cancerous. She has yearly mammogram and MRI.     She is full swing menopause.     She work with a nutritional provider. She lost 60 pounds and gained it back post menopausal. Cholesterol has been an on and off issue     She has gained 60 pounds since menopause    Fatigue: had chronic fatigue diagnosis. She is tired a lot but productive.     GI - She takes probiotics and digestive enzymes. She is in pain all the time   She has had stool sampling       Lifestyle Factors affecting health:   Diet - gluten intolerant. She is on the anti inflammatory diet. She will have hot flashes with inflammatory foods. She is sodium sensitive  Macro diet     Alcohol - no alcohol    Caffeine - no pop, 2 cups of coffee a day.   Endocrinology - tumor on parathyroid - monitoring it due to calcium levels     Exercise -she walks a lot. She has not been lifting weight due to costocondritis      Stress - cares for mother and a step son who struggles with mental health. Causes disagreements with her . She is the primary care giver for her mom and her mom is not the nice person    Dr. Malcolm wants her to start Lexapro.     Sleep - She does not sleep. She had a sleep study 12 years ago and was told it is behavioral. She cannot stay asleep. 3-5 hours and the rest of the night is up and down     Weight: Works with Dr. Malcolm weight loss    Recent illness: August starting being and 6 weeks ago she went to the ER did full testing of URI showing elevated WBC. She got costocondritis and is recovering. She has done antibiotics and coughing. She still has thickened phlegm and cough drops.     Supplements  Deion-eaze - Relora blend, rhodiol, sensoril ashwaganda, l theanine, phosphatidlyserine   2 at night and 1 in the morning  Seamoss - for immune     Previous patient  instructions       1) get blood work fasting     2) Start lexapro     3) hold cortisol Eaze - may start ashwagadha at 500-1000 twice a day      4) email from [facility] - [place] test     5) counselor about ways to set boundaries   6) deep breathing box breathing twice a day in the shower and before falling asleep 30-1 minute.      7) limit the extremely inflammatory things      8) consider restarting acupuncture         A/P  ASSESSMENT AND PLAN:      Recommending the following. She get fasting bloodwork.      Anxiety and stress response I believe is getting the way of her success in weight loss and ability to stabilize thyroid. She is to start lexapro.      She has a lot of stress from caring for her mom. She will discuss tools to set boundaries that she feels comfortable with      She will incorporate deep breathing exercises  Future consider restarting acupuncture     For diet focus on eliminating inflammatory things and work towards cutting them out. Discussed then slowly re introduce foods to see what is actually a trigger for her to widen her food choices for intake     She has a healthcoach. They are working on macros discussed that she needs to look at net cabs. I usually recommend 3 meals a day not 6 to be able to get into a fat burning state. She will see what works best for her. IN depth conversation regarding what it means to be fat adaptive. She uses a juice when she feels ill. I recommended her sitting for 10-15 minutes have a high fat meal and water and see if she regulates    She will stop cortisol eaze     [place] test was ordered to evaluate hormones and cortisol curve     She will follow up after lab testing is done     HPI:     54 yo female presents for follow up.     Notes weight gain of about 30 lbs in one year. States has associated hair breakage.      Currently having hot flashes and night sweats. This is keeping her up at night.      Further reports worsening stress and some poor dietary choices.      Lastly continues to have intermittent GI disturbances.  Reports mostly is having some abdominal pain.  Her twin sister was recently diagnosed possible colitis and Crohn's disease.  She has been working with her  who is working with diet intervention.    [facility] Lab Encounter on 12/16/2023   Component Date Value Ref Range Status    T3 Free 12/16/2023 2.88  2.40 - 4.20 pg/mL Final    Reverse T3 12/16/2023 12.3  9.2 - 24.1 ng/dL Final    Anti-Thyroglobulin 12/16/2023 156 (H)  <60 U/mL Final    This test may exhibit interference when a sample is collected from a person who is consuming high dose of biotin (a.k.a., vitamin B7, vitamin H, coenzyme R) supplements resulting in serum concentrations >=100 ng/mL, leading to falsely depressed Thyroglobulin results (32% - 37% decrease).  Intake of the recommended daily allowance (RDA) for biotin (0.03 mg) has not been shown to typically cause significant interference; however, high dose daily dietary supplements may contain biotin concentrations greater than 150 times (5-10 mg) the RDA.  It is recommended that physicians ask all patients who may be on biotin supplementation to stop biotin consumption at least 72 hours prior to collection of a new sample.      Free T4 12/16/2023 1.0  0.8 - 1.7 ng/dL Final    TSH 12/16/2023 0.893  0.550 - 4.780 mIU/mL Final    Anti-Thyroperoxidase 12/16/2023 743 (H)  <60 U/mL Final    Insulin 12/16/2023 12.4  3.0 - 25.0 mU/L Final    Glucose 12/16/2023 95  70 - 99 mg/dL Final    Sodium 12/16/2023 137  136 - 145 mmol/L Final    Potassium 12/16/2023 4.0  3.5 - 5.1 mmol/L Final    Chloride 12/16/2023 103  98 - 112 mmol/L Final    CO2 12/16/2023 28.0  21.0 - 32.0 mmol/L Final    Anion Gap 12/16/2023 6  0 - 18 mmol/L Final    BUN 12/16/2023 14  9 - 23 mg/dL Final    Creatinine 12/16/2023 0.80  0.55 - 1.02 mg/dL Final    BUN/CREA Ratio 12/16/2023 17.5  10.0 - 20.0 Final    Calcium, Total 12/16/2023 9.7  8.7 - 10.4 mg/dL Final     Calculated Osmolality 12/16/2023 284  275 - 295 mOsm/kg Final    eGFR-Cr 12/16/2023 89  >=60 mL/min/1.73m2 Final    ALT 12/16/2023 20  10 - 49 U/L Final    AST 12/16/2023 18  <=34 U/L Final    Alkaline Phosphatase 12/16/2023 81  41 - 108 U/L Final    Bilirubin, Total 12/16/2023 0.5  0.3 - 1.2 mg/dL Final    Total Protein 12/16/2023 7.7  5.7 - 8.2 g/dL Final    Albumin 12/16/2023 4.8  3.2 - 4.8 g/dL Final    Globulin  12/16/2023 2.9  2.8 - 4.4 g/dL Final    A/G Ratio 12/16/2023 1.7  1.0 - 2.0 Final    Patient Fasting for CMP? 12/16/2023 Yes   Final    Pregnenolone 12/16/2023 21  ng/dL Final    This test was developed and its performance characteristics  determined by [facility]. It has not been cleared or approved  by the Food and Drug Administration.  Reference Range:  Adults: <151    Progesterone 12/16/2023 0.28  No established range for female sex ng/mL Final    Non-Pregnant Females:  Follicular phase:    <= 1.4 ng/ml  Luteal phase:        3.34 - 25.56 ng/ml  Mid-luteal phase:    4.44 - 28.03 ng/ml  Postmenopausal:      <= 0.73 ng/ml    Pregnant Females:  First Trimester:     11.22 - 90.00 ng/ml  Second Trimester:    25.55 - 89.40 ng/ml  Third Trimester:     48.40 - 422.50 ng/ml      Testosterone Tot LC/MS 12/16/2023 10.8  ng/dL Final                             Female:                            Premenopausal    10.0 - 55.0                            Postmenopausal    7.0 - 40.0    Testost % Free+Weak Bnd 12/16/2023 13.6  3.0 - 18.0 % Final    This test was developed and its performance characteristics  determined by [facility]. It has not been cleared or approved  by the Food and Drug Administration.    Testost Free+Weak Bnd 12/16/2023 1.5  0.0 - 9.5 ng/dL Final    Sex Horm Bind Glob 12/16/2023 42.0  17.3 - 125.0 nmol/L Final    Prolactin 12/16/2023 4.4  No Established Reference Range for Females ng/mL Final    Female:  Non-pregnant:    2.8 - 29.2 ng/ml  Pregnant:        >9.7 ng/ml  Postmenopausal:  1.8 - 20.3 ng/ml       LH 12/16/2023 27.6  No established range for female sex mIU/mL Final    Follicular phase:  1.9 - 12.5 mIU/ml  Midcycle:          8.7 - 76.3 mIU/ml  Luteal Phase:      0.5 - 16.9 mIU/ml  Postmenopausal:    5.0 - 55.2 mIU/ml  Pregnant:          < 0.1 - 1.5 mIU/ml      FSH 12/16/2023 80.6  No established range for female sex mIU/mL Final    Estrone 12/16/2023 11  pg/mL Final                                                  Range                      Adult (Premenopausal)    27 - 231                Menstrual Cycle (1-10 days)    19 - 149                Menstrual Cycle (11-20 days)   32 - 176                Menstrual Cycle (21-30 days)   37 - 200                      Adult (Postmenopausal)    0 - 125    Estradiol 12/16/2023 19.6  No established range for female sex pg/mL Final    Follicular phase (-12 to -4 days):   19.5 - 144.2 pg/ml  Midcycle (-3 to +2 days):            63.9 - 356.7 pg/ml  Luteal Phase (+4 to +12 days):       55.8 - 214.2 pg/ml  Postmenopausal (untreated):          <= 32.2 pg/ml      DHEA Sulfate 12/16/2023 88.2  25.9 - 460.2 ug/dL Final    This test may exhibit interference of greater than 10% when a sample is collected from a person who is consuming high dose of biotin (a.k.a., vitamin B7, vitamin H, coenzyme R) supplements resulting in serum concentrations &gt;12.5 ng/mL, leading to either falsely elevated or falsely depressed results.  Intake of the recommended daily allowance (RDA) for biotin (0.03 mg) has not been shown to typically cause significant interference; however, high dose daily dietary supplements may contain biotin concentrations greater than 150 times (5-10 mg) the RDA.  It is recommended that physicians ask all patients who may be on biotin supplementation to stop biotin consumption at least 72 hours prior to collection of a new sample.    Cortisol 12/16/2023 19.2  ug/dL Final    7AM - 9AM Serum: 5.27-22.45 µg/dL  3PM - 5PM Serum: 3.44-16.76 µg/dL    Serum cortisol levels drawn  outside these time frames do not have an associated reference range.              WBC 12/16/2023 2.6 (L)  4.0 - 11.0 x10(3) uL Final    RBC 12/16/2023 5.03  3.80 - 5.30 x10(6)uL Final    HGB 12/16/2023 14.6  12.0 - 16.0 g/dL Final    HCT 12/16/2023 44.2  35.0 - 48.0 % Final    MCV 12/16/2023 87.9  80.0 - 100.0 fL Final    MCH 12/16/2023 29.0  26.0 - 34.0 pg Final    MCHC 12/16/2023 33.0  31.0 - 37.0 g/dL Final    RDW-SD 12/16/2023 40.7  35.1 - 46.3 fL Final    RDW 12/16/2023 12.6  11.0 - 15.0 % Final    PLT 12/16/2023 213.0  150.0 - 450.0 10(3)uL Final    Neutrophil Absolute Prelim 12/16/2023 1.35 (L)  1.50 - 7.70 x10 (3) uL Final    Neutrophil Absolute 12/16/2023 1.35 (L)  1.50 - 7.70 x10(3) uL Final    Lymphocyte Absolute 12/16/2023 0.77 (L)  1.00 - 4.00 x10(3) uL Final    Monocyte Absolute 12/16/2023 0.40  0.10 - 1.00 x10(3) uL Final    Eosinophil Absolute 12/16/2023 0.03  0.00 - 0.70 x10(3) uL Final    Basophil Absolute 12/16/2023 0.02  0.00 - 0.20 x10(3) uL Final    Immature Granulocyte Absolute 12/16/2023 0.00  0.00 - 1.00 x10(3) uL Final    Neutrophil % 12/16/2023 52.4  % Final    Lymphocyte % 12/16/2023 30.0  % Final    Monocyte % 12/16/2023 15.6  % Final    Eosinophil % 12/16/2023 1.2  % Final    Basophil % 12/16/2023 0.8  % Final    Immature Granulocyte % 12/16/2023 0.0  % Final    Vitamin D, 25OH, Total 12/16/2023 38.2  30.0 - 100.0 ng/mL Final    Literature Recommendations for 25(OH)D levels are:  Range           Vitamin D Status   <20    ng/mL      Deficiency   20-<30 ng/mL      Insufficiency    ng/mL      Sufficiency   >100   ng/mL      Toxicity    *Clinical controversy exists regarding optimal 25(OH)D levels. Emerging evidence links potential adverse effects to high levels, particularly >60 ng/mL.            No results found.    REVIEW OF SYSTEMS:   Review of Systems   Constitutional:  Positive for fatigue. Negative for activity change, appetite change and unexpected weight change.    Gastrointestinal:  Positive for constipation. Negative for abdominal distention, abdominal pain and diarrhea.   Psychiatric/Behavioral:  Positive for sleep disturbance. Negative for suicidal ideas. The patient is nervous/anxious.             FAMILY HISTORY:      Family History   Problem Relation Age of Onset    Diabetes Father     Heart Disorder Father         CABG    Prostate Cancer Father     Hypertension Father     Lipids Father     Heart Surgery Father          on table while having TAVR     Dementia Father     Other (Other) Father     Other (Prostate Cancer) Father 60        prostate ca    Breast Cancer Mother 60    Diabetes Mother     Heart Disorder Mother         CABG    Lipids Mother     Hypertension Mother     Colon Cancer Mother 82    Dementia Mother     Depression Mother     Obesity Mother     Psychiatric Mother     Heart Disorder Maternal Grandmother     Uterine Cancer Maternal Grandmother     Depression Maternal Grandmother     Other (Bladder Cancer) Maternal Grandmother     Heart Disorder Maternal Grandfather     Other (Other) Maternal Grandfather     Other (chf) Maternal Grandfather     Heart Disorder Paternal Grandmother     Heart Attack Brother          maker MI    Heart Disorder Brother         Death 59 heart attack    Other (Other) Brother         ETOH    Other (alcoholic) Brother     Other (gout) Brother     Other (prostritis) Brother     Other (alcoholism) Brother     Other (thyroid) Sister         poss. huntingtons    Other (irregular HR) Sister     Cancer Other     Breast Cancer Maternal Aunt         Great Aunt       MEDICAL HISTORY:     Past Medical History:   Diagnosis Date    Abdominal pain Good allergies hurt my stomach    Anxiety     Arthritis     BACK PAIN     chronic/episodic lumbar pain since teens    Back pain     Bloating     Body piercing     Chronic low back pain     Constipation     Cyst of right breast 2015    Deep vein thrombosis (HCC)     18 years old from [facility]  to leg    Disorder of thyroid     Dry eyes     Easy bruising     Esophageal reflux     Fatigue 13 years ago    Hashimotos dx    Fibroids, intramural 10/09/2015    5.3 cm fibroid    Flatulence/gas pain/belching     Food intolerance     GERD     Heart murmur     Heartburn     High cholesterol     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    left    History of cardiac murmur     Hyperlipidemia     Hypothyroidism 2010    Indigestion 21 yrs mirela    Irregular bowel habits     Malleolar fracture 07/05/2013    Nabothian cyst     Night sweats Last 2 years    Menopause    Ovarian cyst, right 10/09/2015    7 mm    Pain in joints     Pain with bowel movements 13 years  ago    Since Hashimotos dx    PONV (postoperative nausea and vomiting)     Problems with swallowing 20 years ago    Right hip pain     20y/o    Sleep disturbance     Spinal stenosis     Stress     Visual impairment     glasses for distance    Wears glasses     Weight loss Last 2 years    Purposely       CURRENT MEDICATIONS:     Current Outpatient Medications   Medication Sig Dispense Refill    pantoprazole 40 MG Oral Tab EC Take 1 tablet (40 mg total) by mouth every morning before breakfast. 30 tablet 0    benzonatate 200 MG Oral Cap Take 1 capsule (200 mg total) by mouth 3 (three) times daily as needed for cough. 20 capsule 0    albuterol 108 (90 Base) MCG/ACT Inhalation Aero Soln Inhale 2 puffs into the lungs every 6 (six) hours as needed. 1 each 1    fluticasone propionate 50 MCG/ACT Nasal Suspension 2 sprays by Nasal route daily. 16 g 0    lisdexamfetamine (VYVANSE) 30 MG Oral Cap Take 1 capsule (30 mg total) by mouth daily. 30 capsule 0    albuterol 108 (90 Base) MCG/ACT Inhalation Aero Soln Inhale 2 puffs into the lungs every 6 (six) hours as needed for Wheezing. 1 each 0    escitalopram 5 MG Oral Tab Take 1 tablet (5 mg total) by mouth daily. 90 tablet 1    CUSTOM MEDICATION T4 75 mcg / T3 15  mcg   Take 1 cap in morning daily on empty stomach 90 each 1     Cholecalciferol (VITAMIN D) 50 MCG (2000 UT) Oral Tab Take 1 tablet by mouth daily.      omega-3 fatty acids 1000 MG Oral Cap Take 1,000 mg by mouth daily.      Coenzyme Q10 (COQ-10 OR) Take 1 tablet by mouth daily. 30 capsule 0    MAGNESIUM OR Take by mouth See Admin Instructions. Take 3-5 tablets by mouth nightly.  30 capsule 0       SOCIAL HISTORY:       Social History     Socioeconomic History    Marital status:    Occupational History    Occupation: occupational therapist     Comment: [employer]- [place]   Tobacco Use    Smoking status: Never    Smokeless tobacco: Never   Vaping Use    Vaping Use: Never used   Substance and Sexual Activity    Alcohol use: No    Drug use: No    Sexual activity: Yes     Partners: Male     Birth control/protection: Hysterectomy   Other Topics Concern    Caffeine Concern Yes    Stress Concern Yes     Comment: Anxiety    Weight Concern Yes     Comment: Struggle with it    Special Diet Yes     Comment: GF and organic 75%    Exercise Yes    Seat Belt Yes       SURGICAL HISTORY:     Past Surgical History:   Procedure Laterality Date    Ankle fracture surgery Right 9/14/2014    ligament damage    Colonoscopy  April 2021    Egd      Endometrial ablation      Hysterectomy  03/2019    Re: uterine fibroid    Roland biopsy stereo nodule 1 site left (cpt=19081)  2010    BENIGN    Roland biopsy stereo nodule 1 site right (cpt=19081)  2019    Roland biopsy stereo nodule 2 site bilat (cpt=19081/29425) Left 2010    benign    Roland biopsy stereo w/calc 1 site left (cpt=19081)  2019    Benign    Roland localization wire 1 site right (cpt=19281)  12/2019    ADH    Oophorectomy      Other surgical history  2006    septoplasty    Other surgical history N/A 4/22/2016    Procedure: HYSTEROSCOPY ENDOMETRIAL ABLATION;  Surgeon: Aemlia Cui MD;  Location:  MAIN OR    Total abdom hysterectomy  03/2019    Total       PHYSICAL EXAM:   There were no vitals filed for this visit.    Physical Exam      ASSESSMENT AND PLAN:     She needs refill lexapro, compound thyroid, protonix     Pregnenolone - 10mg daily   Verify with breast specialist they are okay with this   We will wait on this until we get cortisol and insulin under control as contributing factors of brain fog     Brain Fog can be associated with   Pregnenolone being low  Insulin resistance   High cortisol   High stress  Low estrogen and progesterone     Lexapro increase to 10mg (which two 5mg tabs)   Be on lexapro for 10 days then start Vyvanse     Switch to a B complex when you finish B12     Digestive enzyme - take three times a day with every meal     Insulin - If we are hitting a block with weight loss we will consider adding ALA, Berberine and/or inositol    Magnesium Citrate helps with bowel movements  Magnesium bisglycinate - helps with mood and sleep     Zinc and copper - during cold and flu and stop for summer       1. Dyslipidemia    2. Overweight (BMI 25.0-29.9)    3. Hypothyroidism due to Hashimoto's thyroiditis    4. Gastroesophageal reflux disease, unspecified whether esophagitis present    5. Menopause    6. Insulin resistance    7. Anxiety      Time spent with patient: Over 60 minutes spent in chart review and in direct communication with patient obtaining and reviewing history, creating a unique care plan, explaining the rationale for treatment, reviewing potential SE and overall treatment plan,  documenting all clinical information in Epic. Over 50% of this time was in education, counseling and coordination of care.     Problem List Items Addressed This Visit          Cardiac and Vasculature    Dyslipidemia - Primary       Endocrine and Metabolic    Hypothyroidism    Overweight (BMI 25.0-29.9)       Gastrointestinal and Abdominal    GERD (gastroesophageal reflux disease)     Other Visit Diagnoses       Menopause        Insulin resistance        Anxiety                 Orders Placed This Visit:  No orders of the defined types were  placed in this encounter.    No orders of the defined types were placed in this encounter.      Patient Instructions      Pregnenolone - 10mg daily   Verify with breast specialist they are okay with this   We will wait on this until we get cortisol and insulin under control as contributing factors of brain fog     Brain Fog can be associated with   Pregnenolone being low  Insulin resistance   High cortisol   High stress  Low estrogen and progesterone     Lexapro increase to 10mg (which two 5mg tabs)   Be on lexapro for 10 days then start Vyvanse     Switch to a B complex when you finish B12     Digestive enzyme - take three times a day with every meal     Insulin - If we are hitting a block with weight loss we will consider adding ALA, Berberine and/or inositol    Magnesium Citrate helps with bowel movements  Magnesium bisglycinate - helps with mood and sleep     Zinc and copper - during cold and flu and stop for summer       Return in about 8 weeks (around 3/12/2024).    Patient affirmed understanding of plan and all questions were answered.     Yulisa Meyer PA-C

## 2024-01-29 ENCOUNTER — LAB ENCOUNTER (OUTPATIENT)
Dept: LAB | Facility: HOSPITAL | Age: 54
End: 2024-01-29
Attending: STUDENT IN AN ORGANIZED HEALTH CARE EDUCATION/TRAINING PROGRAM
Payer: COMMERCIAL

## 2024-01-29 DIAGNOSIS — D72.819 LEUKOPENIA, UNSPECIFIED TYPE: ICD-10-CM

## 2024-01-29 LAB
BASOPHILS # BLD AUTO: 0.05 X10(3) UL (ref 0–0.2)
BASOPHILS NFR BLD AUTO: 0.7 %
DEPRECATED RDW RBC AUTO: 39.4 FL (ref 35.1–46.3)
EOSINOPHIL # BLD AUTO: 0.03 X10(3) UL (ref 0–0.7)
EOSINOPHIL NFR BLD AUTO: 0.4 %
ERYTHROCYTE [DISTWIDTH] IN BLOOD BY AUTOMATED COUNT: 12.5 % (ref 11–15)
HCT VFR BLD AUTO: 42.1 %
HGB BLD-MCNC: 14.6 G/DL
IMM GRANULOCYTES # BLD AUTO: 0.01 X10(3) UL (ref 0–1)
IMM GRANULOCYTES NFR BLD: 0.1 %
LYMPHOCYTES # BLD AUTO: 2.04 X10(3) UL (ref 1–4)
LYMPHOCYTES NFR BLD AUTO: 28.6 %
MCH RBC QN AUTO: 30 PG (ref 26–34)
MCHC RBC AUTO-ENTMCNC: 34.7 G/DL (ref 31–37)
MCV RBC AUTO: 86.4 FL
MONOCYTES # BLD AUTO: 0.52 X10(3) UL (ref 0.1–1)
MONOCYTES NFR BLD AUTO: 7.3 %
NEUTROPHILS # BLD AUTO: 4.48 X10 (3) UL (ref 1.5–7.7)
NEUTROPHILS # BLD AUTO: 4.48 X10(3) UL (ref 1.5–7.7)
NEUTROPHILS NFR BLD AUTO: 62.9 %
PLATELET # BLD AUTO: 298 10(3)UL (ref 150–450)
RBC # BLD AUTO: 4.87 X10(6)UL
WBC # BLD AUTO: 7.1 X10(3) UL (ref 4–11)

## 2024-01-29 PROCEDURE — 85025 COMPLETE CBC W/AUTO DIFF WBC: CPT

## 2024-01-29 PROCEDURE — 36415 COLL VENOUS BLD VENIPUNCTURE: CPT

## 2024-02-12 ENCOUNTER — TELEMEDICINE (OUTPATIENT)
Dept: SURGERY | Facility: CLINIC | Age: 54
End: 2024-02-12
Payer: COMMERCIAL

## 2024-02-12 VITALS — BODY MASS INDEX: 30.96 KG/M2 | HEIGHT: 69 IN | WEIGHT: 209 LBS

## 2024-02-12 DIAGNOSIS — E78.5 DYSLIPIDEMIA: ICD-10-CM

## 2024-02-12 DIAGNOSIS — Z51.81 ENCOUNTER FOR THERAPEUTIC DRUG MONITORING: ICD-10-CM

## 2024-02-12 DIAGNOSIS — E66.9 OBESITY (BMI 30-39.9): ICD-10-CM

## 2024-02-12 DIAGNOSIS — F43.9 STRESS: ICD-10-CM

## 2024-02-12 DIAGNOSIS — R63.2 BINGE EATING: Primary | ICD-10-CM

## 2024-02-12 NOTE — PROGRESS NOTES
[facility], [facility], [place]  [address]  [facility] [address]  Dept: [phone]     Virtual video Check-In    Adriana Hoffmann verbally consents to a Virtual/Telephone Check-In visit on 24.  Patient has been referred to the [facility] website at [email or web address] to review the yearly Consent to Treat document.    Patient understands and accepts financial responsibility for any deductible, co-insurance and/or co-pays associated with this service.    Duration of the service: 15 minutes    Video visit        Patient:  Adriana Hoffmann  :      2/3/1970  MRN:      [id number]    Chief Complaint:    Chief Complaint   Patient presents with    Follow - Up     Non surgical weight loss. Video visit       SUBJECTIVE     History of Present Illness:  Adriana is being seen today for a follow-up for non surgical weight loss    Past Medical History:   Past Medical History:   Diagnosis Date    Abdominal pain Good allergies hurt my stomach    Anxiety     Arthritis     BACK PAIN     chronic/episodic lumbar pain since teens    Back pain     Bloating     Body piercing     Chronic low back pain     Constipation     Cyst of right breast 2015    Deep vein thrombosis (HCC)     18 years old from kick to leg    Disorder of thyroid     Dry eyes     Easy bruising     Esophageal reflux     Fatigue 13 years ago    Hashimotos dx    Fibroids, intramural 10/09/2015    5.3 cm fibroid    Flatulence/gas pain/belching     Food intolerance     GERD     Heart murmur     Heartburn     High cholesterol     History of benign eye tumor 2015    left    History of cardiac murmur     Hyperlipidemia     Hypothyroidism 2010    Indigestion 20 yrs mirela    Irregular bowel habits     Malleolar fracture 2013    Nabothian cyst     Night sweats Last 2 years    Menopause    Ovarian cyst, right 10/09/2015    7 mm    Pain in joints     Pain with bowel movements 13 years  ago     Since Hashimotos dx    PONV (postoperative nausea and vomiting)     Problems with swallowing 20 years ago    Right hip pain     18y/o    Sleep disturbance     Spinal stenosis     Stress     Visual impairment     glasses for distance    Wears glasses     Weight loss Last 2 years    Purposely        Comorbidities:  Hyperlipidemia-Improvement?  yes    OBJECTIVE     Vitals: Ht 5' 9\" (1.753 m)   Wt 209 lb (94.8 kg)   LMP 02/07/2019   BMI 30.86 kg/m²     Initial weight loss: -11   Total weight loss: +31   Start weight: 177    Wt Readings from Last 3 Encounters:   02/12/24 209 lb (94.8 kg)   12/13/23 219 lb 12.8 oz (99.7 kg)   10/27/23 220 lb (99.8 kg)       Patient Medications:    Current Outpatient Medications   Medication Sig Dispense Refill    CUSTOM MEDICATION T4 75 mcg / T3 15  mcg   Take 1 cap in morning daily on empty stomach 90 each 1    pantoprazole 40 MG Oral Tab EC Take 1 tablet (40 mg total) by mouth every morning before breakfast. 90 tablet 0    escitalopram 10 MG Oral Tab Take 1 tablet (10 mg total) by mouth daily. 90 tablet 0    benzonatate 200 MG Oral Cap Take 1 capsule (200 mg total) by mouth 3 (three) times daily as needed for cough. 20 capsule 0    albuterol 108 (90 Base) MCG/ACT Inhalation Aero Soln Inhale 2 puffs into the lungs every 6 (six) hours as needed. 1 each 1    fluticasone propionate 50 MCG/ACT Nasal Suspension 2 sprays by Nasal route daily. 16 g 0    albuterol 108 (90 Base) MCG/ACT Inhalation Aero Soln Inhale 2 puffs into the lungs every 6 (six) hours as needed for Wheezing. 1 each 0    Cholecalciferol (VITAMIN D) 50 MCG (2000 UT) Oral Tab Take 1 tablet by mouth daily.      omega-3 fatty acids 1000 MG Oral Cap Take 1,000 mg by mouth daily.      Coenzyme Q10 (COQ-10 OR) Take 1 tablet by mouth daily. 30 capsule 0    MAGNESIUM OR Take by mouth See Admin Instructions. Take 3-5 tablets by mouth nightly.  30 capsule 0     Allergies:  Azithromycin and Codeine     Social History:    Social  History     Socioeconomic History    Marital status:      Spouse name: Not on file    Number of children: Not on file    Years of education: Not on file    Highest education level: Not on file   Occupational History    Occupation: occupational therapist     Comment: [employer]   Tobacco Use    Smoking status: Never    Smokeless tobacco: Never   Vaping Use    Vaping Use: Never used   Substance and Sexual Activity    Alcohol use: No    Drug use: No    Sexual activity: Yes     Partners: Male     Birth control/protection: Hysterectomy   Other Topics Concern     Service Not Asked    Blood Transfusions Not Asked    Caffeine Concern Yes    Occupational Exposure Not Asked    Hobby Hazards Not Asked    Sleep Concern Not Asked    Stress Concern Yes     Comment: Anxiety    Weight Concern Yes     Comment: Struggle with it    Special Diet Yes     Comment: GF and organic 75%    Back Care Not Asked    Exercise Yes    Bike Helmet Not Asked    Seat Belt Yes    Self-Exams Not Asked   Social History Narrative    Not on file     Social Determinants of Health     Financial Resource Strain: Not on file   Food Insecurity: Not on file   Transportation Needs: Not on file   Physical Activity: Not on file   Stress: Not on file   Social Connections: Not on file   Housing Stability: Not on file     Surgical History:    Past Surgical History:   Procedure Laterality Date    ANKLE FRACTURE SURGERY Right 9/14/2014    ligament damage    COLONOSCOPY  April 2021    EGD      ENDOMETRIAL ABLATION      HYSTERECTOMY  03/2019    Re: uterine fibroid    MARY BIOPSY STEREO NODULE 1 SITE LEFT (CPT=19081)  2010    BENIGN    MARY BIOPSY STEREO NODULE 1 SITE RIGHT (CPT=19081)  2019    MARY BIOPSY STEREO NODULE 2 SITE BILAT (CPT=19081/58197) Left 2010    benign    MARY BIOPSY STEREO W/CALC 1 SITE LEFT (CPT=19081)  2019    Benign    MARY LOCALIZATION WIRE 1 SITE RIGHT (CPT=19281)  12/2019    ADH    OOPHORECTOMY      OTHER SURGICAL HISTORY   2006    septoplasty    OTHER SURGICAL HISTORY N/A 2016    Procedure: HYSTEROSCOPY ENDOMETRIAL ABLATION;  Surgeon: Amelia Cui MD;  Location: [facility] MAIN OR    TOTAL ABDOM HYSTERECTOMY  2019    Total     Family History:    Family History   Problem Relation Age of Onset    Diabetes Father     Heart Disorder Father         CABG    Prostate Cancer Father     Hypertension Father     Lipids Father     Heart Surgery Father          on table while having TAVR     Dementia Father     Other (Other) Father     Other (Prostate Cancer) Father 60        prostate ca    Breast Cancer Mother 60    Diabetes Mother     Heart Disorder Mother         CABG    Lipids Mother     Hypertension Mother     Colon Cancer Mother 82    Dementia Mother     Depression Mother     Obesity Mother     Psychiatric Mother     Heart Disorder Maternal Grandmother     Uterine Cancer Maternal Grandmother     Depression Maternal Grandmother     Other (Bladder Cancer) Maternal Grandmother     Heart Disorder Maternal Grandfather     Other (Other) Maternal Grandfather     Other (chf) Maternal Grandfather     Heart Disorder Paternal Grandmother     Heart Attack Brother          maker MI    Heart Disorder Brother         Death 59 heart attack    Other (Other) Brother         ETOH    Other (alcoholic) Brother     Other (gout) Brother     Other (prostritis) Brother     Other (alcoholism) Brother     Other (thyroid) Sister         poss. huntingtons    Other (irregular HR) Sister     Cancer Other     Breast Cancer Maternal Aunt         Great Aunt       Food Journal  Reviewed and Discussed:       Patient has a Food Journal?: yes   Patient is reading nutrition labels?  yes  Average Caloric Intake:     Average CHO Intake: 100  Is patient exercising? yes  Type of exercise?     Eating Habits  Patient states the following:  Eats 5-6 meal(s) per day  Length of time it takes to consume a meal:  20  # of snacks per day: 1 Type of snacks:   nuts  Amount of soda consumption per day:    Amount of water (in ounces) per day:  64  Drinking between meals only:  yes  Toughest challenge:  Food/binge    Nutritional Goals  Limit carbohydrates to 100 gms per day, Eat 100-200 calories within 1 hour of waking  and Eat 3-4 cups of fresh fruits or vegetables daily    Behavior Modifications Reviewed and Discussed  Eat breakfast, Eat 3 meals per day, Plan meals in advance, Read nutrition labels, Drink 64 oz of water per day, Maintain a daily food journal, No drinking 30 minutes before or after meals, Utlize portion control strategies to reduce calorie intake, Identify triggers for eating and manage cues and Eat slowly and take 20 to 30 minutes to complete each meal    Exercise Goals Reviewed and Discussed    Continue seeing      ROS:    Constitutional: negative  Respiratory: negative  Cardiovascular: negative  Gastrointestinal: negative  Musculoskeletal:negative  Neurological: negative  Behavioral/Psych: negative  Endocrine: negative  All other systems were reviewed and are negative    Physical Exam:   Limited due to tele visit  Awake and alert  Speaking full sentences      ASSESSMENT     HYPERCHOLESTEROLEMIA:  The patient states that her cholesterol has been well controlled on her current medication.    Lab Results   Component Value Date/Time    CHOLEST 212 (H) 07/07/2023 08:17 AM     (H) 07/07/2023 08:17 AM    HDL 66 (H) 07/07/2023 08:17 AM    TRIG 60 07/07/2023 08:17 AM    VLDL 11 07/07/2023 08:17 AM    TCHDLRATIO 3.47 06/17/2017 06:35 AM       Encounter Diagnosis(ses):   Encounter Diagnoses   Name Primary?    Binge eating Yes    Stress     Dyslipidemia     Obesity (BMI 30-39.9)     Encounter for therapeutic drug monitoring        PLAN     Patient is not interested in bariatric surgery. Patient desires to pursue traditional weight loss at this time.      DYSLIPIDEMIA: Stable on the above prescribed meal plan and medication. Liver function  stable.    Lab Results   Component Value Date/Time    CHOLEST 212 (H) 07/07/2023 08:17 AM     (H) 07/07/2023 08:17 AM    HDL 66 (H) 07/07/2023 08:17 AM    TRIG 60 07/07/2023 08:17 AM    VLDL 11 07/07/2023 08:17 AM    TCHDLRATIO 3.47 06/17/2017 06:35 AM       Binge eating: improved with Vyvanse    Goals for next month:  1. Keep a food log.  2. Drink 48-64 ounces of non-caloric beverages per day. No fruit juices or regular soda.  3. Increase activity-upper body exercises, walk 10 minutes per day.  4. Increase fruit and vegetable servings to 5-6 per day.      Vyvanse helping  Better with Vyvanse.   ekg done    Has met with integrative medicine  met with endo team    Tolerating Lexapro 10 mg  Continue with psych team      Diagnoses and all orders for this visit:    Binge eating    Stress    Dyslipidemia    Obesity (BMI 30-39.9)    Encounter for therapeutic drug monitoring          Terrance Malcolm MD

## 2024-02-15 ENCOUNTER — TELEPHONE (OUTPATIENT)
Facility: LOCATION | Age: 54
End: 2024-02-15

## 2024-02-15 DIAGNOSIS — N60.91 ATYPICAL DUCTAL HYPERPLASIA OF RIGHT BREAST: Primary | ICD-10-CM

## 2024-02-15 NOTE — TELEPHONE ENCOUNTER
Hi, so the patient is recently a patient of . Oc usually space out her MRI AND Mammogram orders through out the years. So for this year need a order put in for the patient's MRI in April of this year. I already scheduled the patient's appointment with  and when she come in for the appointment  can schedule the mammogram order.      Call back # [phone]

## 2024-02-15 NOTE — TELEPHONE ENCOUNTER
Advised patient Dr. Zambrano will determine further imaging after MRI Breast at follow up visit. Patient voiced understanding.     Future Appointments   Date Time Provider Department Center   3/12/2024  9:00 AM [facility]   4/15/2024  7:00 AM  MR RM2 (1.5T WIDE)  [facility]   5/1/2024  1:00 PM Susan Zambrano MD [id number]   5/20/2024  2:00 PM Terrance Malcolm MD [id number]

## 2024-03-12 ENCOUNTER — TELEMEDICINE (OUTPATIENT)
Dept: INTEGRATIVE MEDICINE | Facility: CLINIC | Age: 54
End: 2024-03-12
Payer: COMMERCIAL

## 2024-03-12 VITALS — WEIGHT: 200 LBS | BODY MASS INDEX: 30 KG/M2

## 2024-03-12 DIAGNOSIS — E78.00 HIGH CHOLESTEROL: ICD-10-CM

## 2024-03-12 DIAGNOSIS — F41.9 ANXIETY: ICD-10-CM

## 2024-03-12 DIAGNOSIS — E03.9 HYPOTHYROIDISM, UNSPECIFIED TYPE: Primary | ICD-10-CM

## 2024-03-12 DIAGNOSIS — E88.819 INSULIN RESISTANCE: ICD-10-CM

## 2024-03-12 DIAGNOSIS — D49.7 PARATHYROID TUMOR: ICD-10-CM

## 2024-03-12 PROCEDURE — 99214 OFFICE O/P EST MOD 30 MIN: CPT | Performed by: PHYSICIAN ASSISTANT

## 2024-03-12 RX ORDER — ESCITALOPRAM OXALATE 10 MG/1
10 TABLET ORAL DAILY
Qty: 90 TABLET | Refills: 1 | Status: SHIPPED | OUTPATIENT
Start: 2024-03-12 | End: 2024-09-08

## 2024-03-12 NOTE — PATIENT INSTRUCTIONS
Speak to your Chakra specialist about how to maintain in between    Consider sauna or sweating for detox     Try to get strength training     Speak to breast specialist about starting pregnenolone

## 2024-03-12 NOTE — PROGRESS NOTES
Adriana Hoffmann is a 54 year old female.  No chief complaint on file.      HPI:   Adriana presents for follow up,     Updates from last visit: She saw Dr. Malcolm since she saw me last. Dr. Malcolm is wanting to stay on lexapro. She is doing well on the Lexapro 10mg. She feels that the vyvanse is helping focus. She is down 23 pounds     She is going to start acupuncture this summer     She is off flonase, pepcid   Thyroid: She will have thyroid ultrasound this summer    Breast : She has a new breast surgeon she will have imaging and then meet new breast surgeon due to pre cancerous lesion     Mental State: lexapro is helping with mood. She has a little anxiety. She is worried about changes.     GI - off pepcid, taking natural anti reflux. Gerd is doing really well.     Weight - down 23 pounts       Lifestyle Factors affecting health:   Diet - gluten free, low carbs, anti inflammatory hashimotos diet. She is working with a health .     Exercise -30 minutes a day of walking.      Stress - Stress with family. Mother and husbands son with changes that causes.     She wants to journal  She has a women that does her Chakra     Sleep - she will get 2-3 days in a row where she is only getting 4 hours of sleep. She will wake up and go back down. She was told she was a behavior sleeper     Supplements: Health  agreed to   Complex b was added.   Omega   Coq10  B complex  Vitamin D   Zinc with copper   Probiotics  Digestive enzymes   Natural anti acid   Trace minerals was being placed with water lemon and apple cider   Kristin powder  Organic shake with flaxseed     Recommended   Gi synergy  Cullen  binder         HPI's FROM PREVIOUS VISITS      Adriana presents for follow up for lab test,     She has been sick for the 6 months. They had covid during the holidays. Dr. Castellano will be rechecking her CBC to make sure things normalize. She has added Vitamin C. She has also added Vitamin D. She has been on this for the last 30  days.     Hormones - She stopped the Vivelle Dot November 2019 due to pre cancerous tumor in breast.     She is getting MRI and mammogram every 6 months.     Thyroid - She feels stable. Last summer she had a lot of hair breakage. Since she went off tirosint and started compound     Lexapro- She started lexapro. She felt great the first week. She feels that she is down in the dumps again. She has been on it for 3 weeks.     Vyvanse- She has not started the Vyvanse yet. She did not want to start it to     GI - Digestive enzymes twice a day. She is on a probiotic   has her on foods with inflammation.   She is on colostrum -erick     Detox - She will work with health , rima. They cut out leptins, caffeine, dairy     She is only on protonix as needed- its causing cough, PND and acid reflex - plan is to get stop it once her diet is back and stress is down.       Lifestyle Factors affecting health:   Diet - low sugar and sodium - trigger hot flashes    Stress - Mother is struggling with her medical and mental health. She started doing a breathing when she is trying to handle her anxiety.     Her  and her were great until after her son became ill, mother they are looking for more help.   He feel that April and May things should be getting better.     She is going to start her counselor back up. She tried counseling with her  but they are able to communicate    Sleep - She does not sleep due to anxiety. When she was in the first week of lexapro her sleep improved. 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       HPI:   Adriana presents for follow up,     Last summer she put on extra weight     Hormones - hysterectomy with ovaries 2020. Vivelle dot 6 months later. November lumpectomy pre cancerous. She has yearly mammogram and MRI.     She is full swing menopause.     She work with a nutritional provider. She lost 60 pounds and gained it back post menopausal. Cholesterol has been an  on and off issue     She has gained 60 pounds since menopause    Fatigue: had chronic fatigue diagnosis. She is tired a lot but productive.     GI - She takes probiotics and digestive enzymes. She is in pain all the time   She has had stool sampling       Lifestyle Factors affecting health:   Diet - gluten intolerant. She is on the anti inflammatory diet. She will have hot flashes with inflammatory foods. She is sodium sensitive  Macro diet     Alcohol - no alcohol    Caffeine - no pop, 2 cups of coffee a day.   Endocrinology - tumor on parathyroid - monitoring it due to calcium levels     Exercise -she walks a lot. She has not been lifting weight due to costocondritis      Stress - cares for mother and a step son who struggles with mental health. Causes disagreements with her . She is the primary care giver for her mom and her mom is not the nice person    Dr. Malcolm wants her to start Lexapro.     Sleep - She does not sleep. She had a sleep study 12 years ago and was told it is behavioral. She cannot stay asleep. 3-5 hours and the rest of the night is up and down     Weight: Works with Dr. Malcolm weight loss    Recent illness: August starting being and 6 weeks ago she went to the ER did full testing of URI showing elevated WBC. She got costocondritis and is recovering. She has done antibiotics and coughing. She still has thickened phlegm and cough drops.     Supplements  Deion-eaze - Relora blend, rhodiol, sensoril ashwaganda, l theanine, phosphatidlyserine   2 at night and 1 in the morning  Seamoss - for immune     Previous patient instructions       1) get blood work fasting     2) Start lexapro     3) hold cortisol Eaze - may start ashwagadha at 500-1000 twice a day      4) email from [facility] [place] test     5) counselor about ways to set boundaries   6) deep breathing box breathing twice a day in the shower and before falling asleep 30-1 minute.      7) limit the extremely inflammatory things      8)  consider restarting acupuncture           ALLERGIES     Allergies   Allergen Reactions    Azithromycin RASH    Codeine NAUSEA ONLY        CURRENT MEDICATIONS:     Current Outpatient Medications   Medication Sig Dispense Refill    escitalopram 10 MG Oral Tab Take 1 tablet (10 mg total) by mouth daily. 90 tablet 1    CUSTOM MEDICATION T4 75 mcg / T3 15  mcg   Take 1 cap in morning daily on empty stomach 90 each 1    pantoprazole 40 MG Oral Tab EC Take 1 tablet (40 mg total) by mouth every morning before breakfast. (Patient not taking: Reported on 3/12/2024) 90 tablet 0    benzonatate 200 MG Oral Cap Take 1 capsule (200 mg total) by mouth 3 (three) times daily as needed for cough. 20 capsule 0    albuterol 108 (90 Base) MCG/ACT Inhalation Aero Soln Inhale 2 puffs into the lungs every 6 (six) hours as needed. 1 each 1    fluticasone propionate 50 MCG/ACT Nasal Suspension 2 sprays by Nasal route daily. (Patient not taking: Reported on 3/12/2024) 16 g 0    albuterol 108 (90 Base) MCG/ACT Inhalation Aero Soln Inhale 2 puffs into the lungs every 6 (six) hours as needed for Wheezing. 1 each 0    Cholecalciferol (VITAMIN D) 50 MCG (2000 UT) Oral Tab Take 1 tablet by mouth daily.      omega-3 fatty acids 1000 MG Oral Cap Take 1,000 mg by mouth daily.      Coenzyme Q10 (COQ-10 OR) Take 1 tablet by mouth daily. 30 capsule 0    MAGNESIUM OR Take by mouth See Admin Instructions. Take 3-5 tablets by mouth nightly.  30 capsule 0       MEDICAL HISTORY:     Past Medical History:   Diagnosis Date    Abdominal pain Good allergies hurt my stomach    Anxiety     Arthritis     BACK PAIN     chronic/episodic lumbar pain since teens    Back pain     Bloating     Body piercing     Chronic low back pain     Constipation     Cyst of right breast 09/17/2015    Deep vein thrombosis (HCC)     18 years old from kick to leg    Disorder of thyroid     Dry eyes     Easy bruising     Esophageal reflux     Fatigue 13 years ago    Hashimotos dx     Fibroids, intramural 10/09/2015    5.3 cm fibroid    Flatulence/gas pain/belching     Food intolerance     GERD     Heart murmur     Heartburn     High cholesterol     History of benign eye tumor 2015    left    History of cardiac murmur     Hyperlipidemia     Hypothyroidism 2010    Indigestion 20 yrs mirela    Irregular bowel habits     Malleolar fracture 2013    Nabothian cyst     Night sweats Last 2 years    Menopause    Ovarian cyst, right 10/09/2015    7 mm    Pain in joints     Pain with bowel movements 13 years  ago    Since Hashimotos dx    PONV (postoperative nausea and vomiting)     Problems with swallowing 20 years ago    Right hip pain     20y/o    Sleep disturbance     Spinal stenosis     Stress     Visual impairment     glasses for distance    Wears glasses     Weight loss Last 2 years    Purposely       SURGICAL HISTORY:     Past Surgical History:   Procedure Laterality Date    Ankle fracture surgery Right 2014    ligament damage    Colonoscopy  2021    Egd      Endometrial ablation      Hysterectomy  2019    Re: uterine fibroid    Roland biopsy stereo nodule 1 site left (cpt=19081)      BENIGN    Roland biopsy stereo nodule 1 site right (cpt=19081)      Roland biopsy stereo nodule 2 site bilat (cpt=19081/03682) Left     benign    Roland biopsy stereo w/calc 1 site left (cpt=19081)      Benign    Roland localization wire 1 site right (cpt=19281)  2019    ADH    Oophorectomy      Other surgical history      septoplasty    Other surgical history N/A 2016    Procedure: HYSTEROSCOPY ENDOMETRIAL ABLATION;  Surgeon: Amelia Cui MD;  Location: [facility] OR    Total abdom hysterectomy  2019    Total       FAMILY HISTORY:      Family History   Problem Relation Age of Onset    Diabetes Father     Heart Disorder Father         CABG    Prostate Cancer Father     Hypertension Father     Lipids Father     Heart Surgery Father          on table while having TAVR      Dementia Father     Other (Other) Father     Other (Prostate Cancer) Father 60        prostate ca    Breast Cancer Mother 60    Diabetes Mother     Heart Disorder Mother         CABG    Lipids Mother     Hypertension Mother     Colon Cancer Mother 82    Dementia Mother     Depression Mother     Obesity Mother     Psychiatric Mother     Heart Disorder Maternal Grandmother     Uterine Cancer Maternal Grandmother     Depression Maternal Grandmother     Other (Bladder Cancer) Maternal Grandmother     Heart Disorder Maternal Grandfather     Other (Other) Maternal Grandfather     Other (chf) Maternal Grandfather     Heart Disorder Paternal Grandmother     Heart Attack Brother          maker MI    Heart Disorder Brother         Death 59 heart attack    Other (Other) Brother         ETOH    Other (alcoholic) Brother     Other (gout) Brother     Other (prostritis) Brother     Other (alcoholism) Brother     Other (thyroid) Sister         poss. huntingtons    Other (irregular HR) Sister     Cancer Other     Breast Cancer Maternal Aunt         Great Aunt       SOCIAL HISTORY:     Social History     Socioeconomic History    Marital status:    Occupational History    Occupation: occupational therapist     Comment: [employer]   Tobacco Use    Smoking status: Never    Smokeless tobacco: Never   Vaping Use    Vaping Use: Never used   Substance and Sexual Activity    Alcohol use: No    Drug use: No    Sexual activity: Yes     Partners: Male     Birth control/protection: Hysterectomy   Other Topics Concern    Caffeine Concern Yes    Stress Concern Yes     Comment: Anxiety    Weight Concern Yes     Comment: Struggle with it    Special Diet Yes     Comment: GF and organic 75%    Exercise Yes    Seat Belt Yes       REVIEW OF SYSTEMS:   Review of Systems     See HPI for pertinent positives and negatives     PHYSICAL EXAM:     Vitals:    03/12/24 0929   Weight: 200 lb (90.7 kg)       Physical Exam     Physical  Exam  Constitutional:       Appearance: Normal appearance.   Neurological:      General: No focal deficit present.      Mental Status: She is alert and oriented to person, place, and time.   Psychiatric:         Mood and Affect: Mood normal.    ASSESSMENT AND PLAN:     Patient is doing well. She is working with a weight loss specialist, a health , a chakra specialist and myself.   She has lost significant weight, is managing her stress better and feeling good. She will continue to work to lose weight and better he health   Blood work has been ordered to evaluate thyroid, lipids and insulin levels   I recommend the following   Speak to your Chakra specialist about how to maintain in between    Consider sauna or sweating for detox     Try to get strength training     Speak to breast specialist about starting pregnenolone     1. Hypothyroidism, unspecified type  - Free T3 (Triiodothryronine); Future  - Reverse T3, Serum; Future  - Thyroid Peroxidase (TPO) AB; Future  - Free T4, (Free Thyroxine); Future  - Assay, Thyroid Stim Hormone; Future  - Thyroid Antithyroglobulin AB; Future  - US THYROID W/PARA-THYRIOD (CPT=76536); Future    2. Parathyroid tumor  - US THYROID W/PARA-THYRIOD (CPT=76536); Future    3. High cholesterol  - Lipid Panel; Future    4. Insulin resistance  - Comp Metabolic Panel (14); Future  - Insulin; Future    5. Anxiety  - escitalopram 10 MG Oral Tab; Take 1 tablet (10 mg total) by mouth daily.  Dispense: 90 tablet; Refill: 1      Time spent with patient: Over 35 minutes spent in chart review and in direct communication with patient obtaining and reviewing history, creating a unique care plan, explaining the rationale for treatment, reviewing potential SE and overall treatment plan,  documenting all clinical information in Epic. Over 50% of this time was in education, counseling and coordination of care.     Problem List Items Addressed This Visit          Endocrine and Metabolic     Hypothyroidism - Primary    Relevant Orders    Free T3 (Triiodothryronine)    Reverse T3, Serum    Thyroid Peroxidase (TPO) AB    Free T4, (Free Thyroxine)    Assay, Thyroid Stim Hormone    Thyroid Antithyroglobulin AB    US THYROID W/PARA-THYRIOD (CPT=76536)     Other Visit Diagnoses       Parathyroid tumor        Relevant Orders    US THYROID W/PARA-THYRIOD (CPT=76536)    High cholesterol        Relevant Orders    Lipid Panel    Insulin resistance        Relevant Orders    Comp Metabolic Panel (14)    Insulin    Anxiety        Relevant Medications    escitalopram 10 MG Oral Tab             Orders Placed This Visit:  Orders Placed This Encounter   Procedures    Free T3 (Triiodothryronine)    Reverse T3, Serum    Thyroid Peroxidase (TPO) AB    Free T4, (Free Thyroxine)    Assay, Thyroid Stim Hormone    Thyroid Antithyroglobulin AB    Comp Metabolic Panel (14)    Insulin    Lipid Panel     No orders of the defined types were placed in this encounter.      Patient Instructions   Speak to your Chakra specialist about how to maintain in between    Consider sauna or sweating for detox     Try to get strength training     Speak to breast specialist about starting pregnenolone     Return in about 4 months (around 7/12/2024) for 30 minutes .    Patient affirmed understanding of plan and all questions were answered.     Yulisa Meyer PA-C

## 2024-04-15 DIAGNOSIS — R63.2 BINGE EATING: ICD-10-CM

## 2024-04-15 RX ORDER — LISDEXAMFETAMINE DIMESYLATE CAPSULES 30 MG/1
30 CAPSULE ORAL DAILY
Qty: 30 CAPSULE | Refills: 0 | Status: SHIPPED | OUTPATIENT
Start: 2024-04-15 | End: 2024-05-15

## 2024-05-04 DIAGNOSIS — F41.9 ANXIETY: ICD-10-CM

## 2024-05-04 RX ORDER — ESCITALOPRAM OXALATE 10 MG/1
10 TABLET ORAL DAILY
Qty: 90 TABLET | Refills: 1 | Status: CANCELLED | OUTPATIENT
Start: 2024-05-04 | End: 2024-10-31

## 2024-05-14 ENCOUNTER — HOSPITAL ENCOUNTER (OUTPATIENT)
Dept: ULTRASOUND IMAGING | Facility: HOSPITAL | Age: 54
Discharge: HOME | End: 2024-05-14
Attending: PHYSICIAN ASSISTANT

## 2024-05-14 DIAGNOSIS — D49.7 PARATHYROID TUMOR: ICD-10-CM

## 2024-05-14 DIAGNOSIS — E03.9 HYPOTHYROIDISM, UNSPECIFIED TYPE: ICD-10-CM

## 2024-05-14 PROCEDURE — 76536 US EXAM OF HEAD AND NECK: CPT | Performed by: PHYSICIAN ASSISTANT

## 2024-05-17 ENCOUNTER — PATIENT MESSAGE (OUTPATIENT)
Dept: INTEGRATIVE MEDICINE | Facility: CLINIC | Age: 54
End: 2024-05-17

## 2024-05-17 NOTE — PROGRESS NOTES
Alis Wright    I have reviewed your thyroid ultrasound. I have ordered a blood test for further evaluation. Please proceed to the lab for this test. We will be evaluating the function of your parathyroid gland. There is an abnormality we need to follow.         Yulisa Meyer PA-C

## 2024-05-18 ENCOUNTER — LAB ENCOUNTER (OUTPATIENT)
Dept: LAB | Facility: REFERENCE LAB | Age: 54
End: 2024-05-18
Attending: STUDENT IN AN ORGANIZED HEALTH CARE EDUCATION/TRAINING PROGRAM

## 2024-05-18 DIAGNOSIS — E78.00 HIGH CHOLESTEROL: ICD-10-CM

## 2024-05-18 DIAGNOSIS — E88.819 INSULIN RESISTANCE: ICD-10-CM

## 2024-05-18 DIAGNOSIS — E03.9 HYPOTHYROIDISM, UNSPECIFIED TYPE: ICD-10-CM

## 2024-05-18 DIAGNOSIS — D49.7 PARATHYROID TUMOR: ICD-10-CM

## 2024-05-18 LAB
ALBUMIN SERPL-MCNC: 4.9 G/DL (ref 3.2–4.8)
ALBUMIN/GLOB SERPL: 1.8 {RATIO} (ref 1–2)
ALP LIVER SERPL-CCNC: 91 U/L
ALT SERPL-CCNC: 20 U/L
ANION GAP SERPL CALC-SCNC: 8 MMOL/L (ref 0–18)
AST SERPL-CCNC: 25 U/L (ref ?–34)
BILIRUB SERPL-MCNC: 0.6 MG/DL (ref 0.3–1.2)
BUN BLD-MCNC: 14 MG/DL (ref 9–23)
BUN/CREAT SERPL: 16.1 (ref 10–20)
CALCIUM BLD-MCNC: 10 MG/DL (ref 8.7–10.4)
CHLORIDE SERPL-SCNC: 104 MMOL/L (ref 98–112)
CHOLEST SERPL-MCNC: 233 MG/DL (ref ?–200)
CO2 SERPL-SCNC: 28 MMOL/L (ref 21–32)
CREAT BLD-MCNC: 0.87 MG/DL
EGFRCR SERPLBLD CKD-EPI 2021: 80 ML/MIN/1.73M2 (ref 60–?)
FASTING PATIENT LIPID ANSWER: YES
FASTING STATUS PATIENT QL REPORTED: YES
GLOBULIN PLAS-MCNC: 2.7 G/DL (ref 2–3.5)
GLUCOSE BLD-MCNC: 104 MG/DL (ref 70–99)
HDLC SERPL-MCNC: 61 MG/DL (ref 40–59)
INSULIN SERPL-ACNC: 10.7 MU/L (ref 3–25)
LDLC SERPL CALC-MCNC: 148 MG/DL (ref ?–100)
NONHDLC SERPL-MCNC: 172 MG/DL (ref ?–130)
OSMOLALITY SERPL CALC.SUM OF ELEC: 291 MOSM/KG (ref 275–295)
POTASSIUM SERPL-SCNC: 3.7 MMOL/L (ref 3.5–5.1)
PROT SERPL-MCNC: 7.6 G/DL (ref 5.7–8.2)
PTH-INTACT SERPL-MCNC: 30.1 PG/ML (ref 18.5–88)
SODIUM SERPL-SCNC: 140 MMOL/L (ref 136–145)
T3FREE SERPL-MCNC: 2.55 PG/ML (ref 2.4–4.2)
T4 FREE SERPL-MCNC: 0.7 NG/DL (ref 0.8–1.7)
THYROGLOB SERPL-MCNC: 197 U/ML (ref ?–60)
THYROPEROXIDASE AB SERPL-ACNC: 966 U/ML (ref ?–60)
TRIGL SERPL-MCNC: 136 MG/DL (ref 30–149)
TSI SER-ACNC: 2.79 MIU/ML (ref 0.55–4.78)
VLDLC SERPL CALC-MCNC: 26 MG/DL (ref 0–30)

## 2024-05-18 PROCEDURE — 84443 ASSAY THYROID STIM HORMONE: CPT | Performed by: PHYSICIAN ASSISTANT

## 2024-05-18 PROCEDURE — 86376 MICROSOMAL ANTIBODY EACH: CPT | Performed by: PHYSICIAN ASSISTANT

## 2024-05-18 PROCEDURE — 86800 THYROGLOBULIN ANTIBODY: CPT | Performed by: PHYSICIAN ASSISTANT

## 2024-05-18 PROCEDURE — 83970 ASSAY OF PARATHORMONE: CPT | Performed by: PHYSICIAN ASSISTANT

## 2024-05-18 PROCEDURE — 80061 LIPID PANEL: CPT | Performed by: PHYSICIAN ASSISTANT

## 2024-05-18 PROCEDURE — 84439 ASSAY OF FREE THYROXINE: CPT | Performed by: PHYSICIAN ASSISTANT

## 2024-05-18 PROCEDURE — 83525 ASSAY OF INSULIN: CPT | Performed by: PHYSICIAN ASSISTANT

## 2024-05-18 PROCEDURE — 80053 COMPREHEN METABOLIC PANEL: CPT | Performed by: PHYSICIAN ASSISTANT

## 2024-05-18 PROCEDURE — 84482 T3 REVERSE: CPT | Performed by: PHYSICIAN ASSISTANT

## 2024-05-18 PROCEDURE — 84481 FREE ASSAY (FT-3): CPT | Performed by: PHYSICIAN ASSISTANT

## 2024-05-25 LAB — REVERSE T3: 9.2 NG/DL

## 2024-06-01 ENCOUNTER — OFFICE VISIT (OUTPATIENT)
Dept: ENDOCRINOLOGY CLINIC | Facility: CLINIC | Age: 54
End: 2024-06-01
Payer: COMMERCIAL

## 2024-06-01 ENCOUNTER — PATIENT MESSAGE (OUTPATIENT)
Dept: ENDOCRINOLOGY CLINIC | Facility: CLINIC | Age: 54
End: 2024-06-01

## 2024-06-01 VITALS
DIASTOLIC BLOOD PRESSURE: 74 MMHG | HEART RATE: 67 BPM | WEIGHT: 205 LBS | HEIGHT: 69.02 IN | SYSTOLIC BLOOD PRESSURE: 115 MMHG | BODY MASS INDEX: 30.36 KG/M2

## 2024-06-01 DIAGNOSIS — E28.39 ESTROGEN DEFICIENCY: ICD-10-CM

## 2024-06-01 DIAGNOSIS — E04.1 THYROID NODULE: Primary | ICD-10-CM

## 2024-06-01 DIAGNOSIS — E21.5 PARATHYROID ABNORMALITY (HCC): ICD-10-CM

## 2024-06-01 PROCEDURE — 3074F SYST BP LT 130 MM HG: CPT | Performed by: INTERNAL MEDICINE

## 2024-06-01 PROCEDURE — 3008F BODY MASS INDEX DOCD: CPT | Performed by: INTERNAL MEDICINE

## 2024-06-01 PROCEDURE — 99213 OFFICE O/P EST LOW 20 MIN: CPT | Performed by: INTERNAL MEDICINE

## 2024-06-01 PROCEDURE — 3078F DIAST BP <80 MM HG: CPT | Performed by: INTERNAL MEDICINE

## 2024-06-01 NOTE — PROGRESS NOTES
FU VISIT:     CHIEF COMPLAINT:    Chief Complaint   Patient presents with    Follow - Up     Pt is in for a Thyroid follow up        HISTORY OF PRESENT ILLNESS:   Adriana Hoffmann is a 54 year old female who presents for evaluation of thyroid nodules.     Discovered incidentally on imaging: states she might have bene diagnosed around age 41  Thyroid US 2022:     RIGHT THYROID NODULE: 0.9 x 0.4 x 0.4 cm.   LEFT THYROID NODULE: 0.9 x 0.4 x 0.4 cm.     THYROID US 2023:   Bilateral thyroid nodules are present.  None of these lesions meets criteria for biopsy based on TIRADS guidelines at this time.  A left lower pole hypoechoic nodular area similar to the prior exam and could represent a parathyroid adenoma.     THYROID US 2024:   A 0.9 x 0.4 x 0.4 cm solid nodule abutting the lower pole of the left lobe of the thyroid.  If parathyroid function tests are abnormal, this could represent a parathyroid adenoma.  If there is some uncertainty, a parathyroid nuclear scan could be   obtained for further evaluation.       Personal or Family history of thyroid cancer: denies  Personal or family history of MEN: denies  Exposure to head and neck radiation before age 21: denies  Compressive symptoms (difficulty in breathing or swallowing): denies      She also has hashimoto      PAST MEDICAL HISTORY:   Past Medical History:    Abdominal pain    Anxiety    Arthritis    BACK PAIN    chronic/episodic lumbar pain since teens    Back pain    Bloating    Body piercing    Chronic low back pain    Constipation    Cyst of right breast    Deep vein thrombosis (HCC)    18 years old from kick to leg    Disorder of thyroid    Dry eyes    Easy bruising    Esophageal reflux    Fatigue    Hashimotos dx    Fibroids, intramural    5.3 cm fibroid    Flatulence/gas pain/belching    Food intolerance    GERD    Heart murmur    Heartburn    High cholesterol    History of benign eye tumor    left    History of cardiac murmur    Hyperlipidemia     Hypothyroidism    Indigestion    Irregular bowel habits    Malleolar fracture    Nabothian cyst    Night sweats    Menopause    Ovarian cyst, right    7 mm    Pain in joints    Pain with bowel movements    Since Hashimotos dx    PONV (postoperative nausea and vomiting)    Problems with swallowing    Right hip pain    20y/o    Sleep disturbance    Spinal stenosis    Stress    Visual impairment    glasses for distance    Wears glasses    Weight loss    Purposely       PAST SURGICAL HISTORY:   Past Surgical History:   Procedure Laterality Date    Ankle fracture surgery Right 9/14/2014    ligament damage    Colonoscopy  April 2021    Egd      Endometrial ablation      Hysterectomy  03/2019    Re: uterine fibroid    Roland biopsy stereo nodule 1 site left (cpt=19081)  2010    BENIGN    Roland biopsy stereo nodule 1 site right (cpt=19081)  2019    Roland biopsy stereo nodule 2 site bilat (cpt=19081/78905) Left 2010    benign    Roland biopsy stereo w/calc 1 site left (cpt=19081)  2019    Benign    Roland localization wire 1 site right (cpt=19281)  12/2019    ADH    Oophorectomy      Other surgical history  2006    septoplasty    Other surgical history N/A 4/22/2016    Procedure: HYSTEROSCOPY ENDOMETRIAL ABLATION;  Surgeon: Amelia Cui MD;  Location: [facility] OR    Total abdom hysterectomy  03/2019    Total       CURRENT MEDICATIONS:    Current Outpatient Medications   Medication Sig Dispense Refill    CUSTOM MEDICATION T4 75 mcg / T3 15  mcg   Take 1 cap in morning daily on empty stomach 90 each 1    escitalopram 10 MG Oral Tab Take 1 tablet (10 mg total) by mouth daily. 90 tablet 1    benzonatate 200 MG Oral Cap Take 1 capsule (200 mg total) by mouth 3 (three) times daily as needed for cough. 20 capsule 0    albuterol 108 (90 Base) MCG/ACT Inhalation Aero Soln Inhale 2 puffs into the lungs every 6 (six) hours as needed. 1 each 1    albuterol 108 (90 Base) MCG/ACT Inhalation Aero Soln Inhale 2 puffs into the lungs every 6 (six)  hours as needed for Wheezing. 1 each 0    Cholecalciferol (VITAMIN D) 50 MCG (2000 UT) Oral Tab Take 1 tablet by mouth daily.      omega-3 fatty acids 1000 MG Oral Cap Take 1,000 mg by mouth daily.      Coenzyme Q10 (COQ-10 OR) Take 1 tablet by mouth daily. 30 capsule 0    MAGNESIUM OR Take by mouth See Admin Instructions. Take 3-5 tablets by mouth nightly.  30 capsule 0       ALLERGIES:  Allergies   Allergen Reactions    Azithromycin RASH    Codeine NAUSEA ONLY       SOCIAL HISTORY:    Social History     Socioeconomic History    Marital status:    Occupational History    Occupation: occupational therapist     Comment: [employer]   Tobacco Use    Smoking status: Never    Smokeless tobacco: Never   Vaping Use    Vaping status: Never Used   Substance and Sexual Activity    Alcohol use: No    Drug use: No    Sexual activity: Yes     Partners: Male     Birth control/protection: Hysterectomy   Other Topics Concern    Caffeine Concern Yes    Stress Concern Yes     Comment: Anxiety    Weight Concern Yes     Comment: Struggle with it    Special Diet Yes     Comment: GF and organic 75%    Exercise Yes    Seat Belt Yes       FAMILY HISTORY:   Family History   Problem Relation Age of Onset    Diabetes Father     Heart Disorder Father         CABG    Prostate Cancer Father     Hypertension Father     Lipids Father     Heart Surgery Father          on table while having TAVR     Dementia Father     Other (Other) Father     Other (Prostate Cancer) Father 60        prostate ca    Breast Cancer Mother 60    Diabetes Mother     Heart Disorder Mother         CABG    Lipids Mother     Hypertension Mother     Colon Cancer Mother 82    Dementia Mother     Depression Mother     Obesity Mother     Psychiatric Mother     Heart Disorder Maternal Grandmother     Uterine Cancer Maternal Grandmother     Depression Maternal Grandmother     Other (Bladder Cancer) Maternal Grandmother     Heart Disorder Maternal  Grandfather     Other (Other) Maternal Grandfather     Other (chf) Maternal Grandfather     Heart Disorder Paternal Grandmother     Heart Attack Brother          maker MI    Heart Disorder Brother         Death 59 heart attack    Other (Other) Brother         ETOH    Other (alcoholic) Brother     Other (gout) Brother     Other (prostritis) Brother     Other (alcoholism) Brother     Other (thyroid) Sister         poss. huntingtons    Other (irregular HR) Sister     Cancer Other     Breast Cancer Maternal Aunt         Great Aunt       ASSESSMENTS:     REVIEW OF SYSTEMS:  Constitutional: Negative for:  fever, fatigue, cold/heat intolerance, + weight change intentional  Eyes: Negative for:  Visual changes, proptosis, blurring  ENT: Negative for:  dysphagia, neck swelling, dysphonia  Respiratory: Negative for:  dyspnea, cough  Cardiovascular: Negative for:  chest pain, palpitations, orthopnea  GI: Negative for:  abdominal pain, nausea, vomiting, diarrhea, constipation, bleeding  Neurology: Negative for: headache, numbness, weakness  Genito-Urinary: Negative for: dysuria, frequency  Psychiatric: Negative for:  depression, anxiety  Hematology/Lymphatics: Negative for: bruising, lower extremity edema  Endocrine: Negative for: polyuria, polydypsia  Skin: Negative for: rash, blister, cellulitis,      PHYSICAL EXAM:   Vitals:    06/01/24 0831   BP: 115/74   Pulse: 67   Weight: 205 lb (93 kg)   Height: 5' 9.02\" (1.753 m)     BMI: Body mass index is 30.26 kg/m².         General Appearance:  alert, well developed, in no acute distress  Head: Atraumatic  Eyes:  normal conjunctivae, sclera., normal sclera and normal pupils  Throat/Neck: normal sound to voice. Normal hearing, normal speech, no thyroid nodule palpated  Respiratory:  Speaking in full sentences, non-labored. no increased work of breathing, no audible wheezing    Neuro: motor grossly intact, moving all extremities without difficulty  Psychiatric:  oriented to time,  self, and place  Extremities: no obvious extremity swelling, no lesions        DATA:     Pertinent data reviewed      ASSESSMENT AND PLAN:    Patient is a 54 year old female with hashimotos and thyroid nodule/ parathyroid nodule  -Discussed common occurrence of thyroid nodules in the population approx 50-60% of the population  -Discussed risk of malignancy is approx 3-5%, 95-97% of nodules are benign  -Discussed recommendation to perform FNA biopsy of nodules greater than 1cm in size  -Discussed that if nodule is benign then plan to follow with yearly thyroid ultrasound    No RF for thyroid cancer on history  No compressive symptoms    We reviewed her US in detail   Right side: no nodule/s seen  Left side: nodule seen thyroid vs parathyroid nodule  : A 0.9 x 0.4 x 0.4 cm oval-shaped hypoechoic nodule abutting the inferior left lobe of the thyroid.  Uncertain whether this is a small exophytic TR 4 solid thyroid nodule, a small lymph node, or a enlarged parathyroid.  No other nodule.   Discussed PTH - calcium axis  She denies a h/o renal stones  Her mother does have a h/o parathyrod adenoma and renal stones  PTH has been normal   Patient will also like to get a NM parathyroid scan , this has been ordered  Stay hydrated  No OTC calcium supplements, but normal calcium intake in diet      To call if she develops any compressive symptoms    Will also get a DXA scan         Celina Camejo MD        Patient verbalized a complete  understanding of all of the above and did not have any further questions.

## 2024-07-01 ENCOUNTER — HOSPITAL ENCOUNTER (OUTPATIENT)
Dept: NUCLEAR MEDICINE | Facility: HOSPITAL | Age: 54
Discharge: HOME | End: 2024-07-01
Attending: INTERNAL MEDICINE
Payer: COMMERCIAL

## 2024-07-01 DIAGNOSIS — E21.5 PARATHYROID ABNORMALITY (HCC): ICD-10-CM

## 2024-07-01 PROCEDURE — 78072 PARATHYRD PLANAR W/SPECT&CT: CPT | Performed by: INTERNAL MEDICINE

## 2024-07-09 ENCOUNTER — OFFICE VISIT (OUTPATIENT)
Dept: SURGERY | Facility: CLINIC | Age: 54
End: 2024-07-09
Payer: COMMERCIAL

## 2024-07-09 VITALS
SYSTOLIC BLOOD PRESSURE: 116 MMHG | BODY MASS INDEX: 30 KG/M2 | WEIGHT: 205 LBS | DIASTOLIC BLOOD PRESSURE: 75 MMHG | OXYGEN SATURATION: 97 % | TEMPERATURE: 98 F | RESPIRATION RATE: 16 BRPM | HEART RATE: 85 BPM

## 2024-07-09 DIAGNOSIS — Z91.89 AT HIGH RISK FOR BREAST CANCER: Primary | ICD-10-CM

## 2024-07-09 PROCEDURE — 3078F DIAST BP <80 MM HG: CPT | Performed by: SURGERY

## 2024-07-09 PROCEDURE — 99243 OFF/OP CNSLTJ NEW/EST LOW 30: CPT | Performed by: SURGERY

## 2024-07-09 PROCEDURE — 3074F SYST BP LT 130 MM HG: CPT | Performed by: SURGERY

## 2024-07-09 NOTE — PATIENT INSTRUCTIONS
Return to clinic in 1 year  Mammogram: Next screening mammogram 8/2024 (ordered)  MRI: Next screening MRI 2/2025 (ordered)

## 2024-07-09 NOTE — CONSULTS
Breast Surgery New Patient Consultation    Adriana Hoffmann is a 54 year old patient, referred by Dr. Castellano, who presents for follow up for R breast ADH.    History of Present Illness:   Ms. Adriana Hoffmann is a 54 year old woman with a past history significant for right breast ADH who presents to establish follow up care.     She underwent right breast lumpectomy on December 10, 2019 for ADH by Dr. Renae. Final pathology showed ADH. She declined tamoxifen therapy in January 2020. She has been undergoing high risk surveillance (alternating mammograms and breast MRI). Her last screening mammogram was on 8/24/2023 and showed density C breast tissue and no suspicious masses or calcifications. Her breast screening MRI was due in February 2024 but the patient was helping take care of her sick mother at the time.      She denies any palpable masses, nipple discharge, skin changes, or axillary adenopathy. She does not have breast pain. She has a history of benign bilateral breast biopsies. She does have family history of breast cancer. Her mother had breast cancer at age 60 and is still alive at age 86. Her great aunt had breast cancer as well. She does not have a history of genetic testing.          Past Medical History:    Abdominal pain    Anxiety    Arthritis    BACK PAIN    chronic/episodic lumbar pain since teens    Back pain    Bloating    Body piercing    Chronic low back pain    Constipation    Cyst of right breast    Deep vein thrombosis (HCC)    18 years old from kick to leg    Disorder of thyroid    Dry eyes    Easy bruising    Esophageal reflux    Fatigue    Hashimotos dx    Fibroids, intramural    5.3 cm fibroid    Flatulence/gas pain/belching    Food intolerance    GERD    Heart murmur    Heartburn    High cholesterol    History of benign eye tumor    left    History of cardiac murmur    Hyperlipidemia    Hypothyroidism    Indigestion    Irregular bowel habits    Malleolar fracture    Nabothian  cyst    Night sweats    Menopause    Ovarian cyst, right    7 mm    Pain in joints    Pain with bowel movements    Since Hashimotos dx    PONV (postoperative nausea and vomiting)    Problems with swallowing    Right hip pain    18y/o    Sleep disturbance    Spinal stenosis    Stress    Visual impairment    glasses for distance    Wears glasses    Weight loss    Purposely       Past Surgical History:   Procedure Laterality Date    Ankle fracture surgery Right 2014    ligament damage    Colonoscopy  2021    Egd      Endometrial ablation      Hysterectomy  2019    Re: uterine fibroid    Roland biopsy stereo nodule 1 site left (cpt=19081)      BENIGN    Roland biopsy stereo nodule 1 site right (cpt=19081)      Roland biopsy stereo nodule 2 site bilat (cpt=19081/62189) Left     benign    Roland biopsy stereo w/calc 1 site left (cpt=19081)      Benign    Roland localization wire 1 site right (cpt=19281)  2019    ADH    Oophorectomy      Other surgical history      septoplasty    Other surgical history N/A 2016    Procedure: HYSTEROSCOPY ENDOMETRIAL ABLATION;  Surgeon: Amelia Cui MD;  Location: [facility] OR    Total abdom hysterectomy  2019    Total       Gynecological History:  Menarche at age 12 and LMP 3/2019  Pt is a   Pt was n/a years old at time of first pregnancy.    She denies any cumulative breastfeeding history   Age of Menopause: 41  Type: surgical (hysterectomy, BSO)  She has history of hormone replacement therapy for 6 months, last   She has history of oral contraceptive use for 22 years, last    She denies infertility treatment to achieve pregnancy.    Medications:     escitalopram 10 MG Oral Tab Take 1 tablet (10 mg total) by mouth daily. 90 tablet 1    CUSTOM MEDICATION T4 75 mcg / T3 15  mcg   Take 1 cap in morning daily on empty stomach 90 each 1    Cholecalciferol (VITAMIN D) 50 MCG ( UT) Oral Tab Take 1 tablet by mouth daily.      omega-3 fatty acids  1000 MG Oral Cap Take 1,000 mg by mouth daily.      Coenzyme Q10 (COQ-10 OR) Take 1 tablet by mouth daily. 30 capsule 0    MAGNESIUM OR Take by mouth See Admin Instructions. Take 3-5 tablets by mouth nightly.  30 capsule 0       Allergies:    Allergies   Allergen Reactions    Azithromycin RASH    Codeine NAUSEA ONLY       Family History:   Family History   Problem Relation Age of Onset    Diabetes Father     Heart Disorder Father         CABG    Prostate Cancer Father     Hypertension Father     Lipids Father     Heart Surgery Father          on table while having TAVR     Dementia Father     Other (Other) Father     Other (Prostate Cancer) Father 60        prostate ca    Breast Cancer Mother 60    Diabetes Mother     Heart Disorder Mother         CABG    Lipids Mother     Hypertension Mother     Colon Cancer Mother 82    Dementia Mother     Depression Mother     Obesity Mother     Psychiatric Mother     Heart Disorder Maternal Grandmother     Uterine Cancer Maternal Grandmother     Depression Maternal Grandmother     Other (Bladder Cancer) Maternal Grandmother     Heart Disorder Maternal Grandfather     Other (Other) Maternal Grandfather     Other (chf) Maternal Grandfather     Heart Disorder Paternal Grandmother     Heart Attack Brother          maker MI    Heart Disorder Brother         Death 59 heart attack    Other (Other) Brother         ETOH    Other (alcoholic) Brother     Other (gout) Brother     Other (prostritis) Brother     Other (alcoholism) Brother     Other (thyroid) Sister         poss. huntingtons    Other (irregular HR) Sister     Cancer Other     Breast Cancer Maternal Aunt         Great Aunt       Social History:  History   Alcohol Use No       History   Smoking Status    Never   Smokeless Tobacco    Never       Review of Systems:    Review of Systems   Constitutional:  Negative for activity change, appetite change, chills, fatigue and unexpected weight change.   HENT:  Negative for ear  pain, hearing loss, nosebleeds, sore throat, trouble swallowing and voice change.    Eyes:  Negative for pain and visual disturbance.   Respiratory:  Negative for cough, chest tightness and shortness of breath.    Cardiovascular:  Negative for chest pain, palpitations and leg swelling.   Gastrointestinal:  Negative for nausea, vomiting, abdominal pain, diarrhea, constipation and blood in stool.   Endocrine: Negative for cold intolerance and heat intolerance.   Genitourinary:  Negative for dysuria, hematuria and difficulty urinating.   Musculoskeletal:  Negative for back pain, joint swelling, joint pain and neck pain.   Skin:  Negative for color change, rash and wound.   Allergic/Immunologic: Negative for environmental allergies.   Neurological:  Negative for tremors, syncope, facial asymmetry, speech difficulty and weakness.   Hematological:  Negative for adenopathy. Does not bruise/bleed easily.   Psychiatric/Behavioral:  Negative for hallucinations, behavioral problems, confusion, agitation and depressed mood.       Otherwise as per HPI.    Physical Exam:    /75 (BP Location: Right arm, Patient Position: Sitting, Cuff Size: adult)   Pulse 85   Temp 97.5 °F (36.4 °C) (Tympanic)   Resp 16   Wt 93 kg (205 lb)   LMP 02/07/2019   SpO2 97%   BMI 30.26 kg/m²     Physical Exam  Vitals reviewed.   Constitutional:       Appearance: Normal appearance.   HENT:      Head: Normocephalic and atraumatic.   Eyes:      Extraocular Movements: Extraocular movements intact.      Pupils: Pupils are equal, round, and reactive to light.   Cardiovascular:      Rate and Rhythm: Normal rate.   Pulmonary:      Effort: Pulmonary effort is normal.   Chest:   Breasts:     Right: Normal. No mass, nipple discharge, skin change or tenderness.      Left: Normal. No mass, nipple discharge, skin change or tenderness.          Comments: Well healed lumpectomy incision  Abdominal:      General: Abdomen is flat.      Palpations: Abdomen is  soft.   Musculoskeletal:         General: Normal range of motion.      Cervical back: Normal range of motion and neck supple.   Lymphadenopathy:      Upper Body:      Right upper body: No supraclavicular or axillary adenopathy.      Left upper body: No supraclavicular or axillary adenopathy.   Skin:     General: Skin is warm and dry.   Neurological:      General: No focal deficit present.      Mental Status: She is alert and oriented to person, place, and time.   Psychiatric:         Mood and Affect: Mood normal.          Labs/imaging: reviewed in EPIC    Impression:   Ms. Adriana Hoffmann is a 54 year old woman that presents for follow up of right breast ADH.    Discussion and Plan:  I had a discussion with the Patient regarding her breast exam. On exam today I found no evidence of disease. I personally reviewed her recent imaging and we discussed this.    Genetic counseling: Patient was offered in 2019 but is not interested at this time    Further screening:   Mammogram: Next screening mammogram 8/2024 (ordered)  MRI: Next screening MRI 2/2025 (ordered)     Chemoprophylaxis: patient not interested at this time due to current menopausal symptoms. She had met with med onc in 2019    Return to clinic:   1 year for breast exam  See PCP/OB/GYN at least annually for additional breast exam      She was given ample opportunity for questions and those questions were answered to her satisfaction. She has been encouraged to contact the office with any questions or concerns prior to her next appointment. This encounter lasted a total of 30 minutes, more than 50% of which was dedicated to the discussion of management options.     Earl Anderson MD  Breast Surgical Oncology    CC: Dr. Castellano

## 2024-07-20 DIAGNOSIS — R63.2 BINGE EATING: ICD-10-CM

## 2024-07-22 RX ORDER — LISDEXAMFETAMINE DIMESYLATE 30 MG/1
30 CAPSULE ORAL DAILY
Qty: 30 CAPSULE | Refills: 0 | Status: SHIPPED | OUTPATIENT
Start: 2024-07-22

## 2024-07-30 ENCOUNTER — HOSPITAL ENCOUNTER (OUTPATIENT)
Dept: BONE DENSITY | Facility: HOSPITAL | Age: 54
Discharge: HOME | End: 2024-07-30
Attending: INTERNAL MEDICINE
Payer: COMMERCIAL

## 2024-07-30 DIAGNOSIS — E28.39 ESTROGEN DEFICIENCY: ICD-10-CM

## 2024-07-30 PROCEDURE — 77080 DXA BONE DENSITY AXIAL: CPT | Performed by: INTERNAL MEDICINE

## 2024-08-23 ENCOUNTER — PATIENT MESSAGE (OUTPATIENT)
Dept: FAMILY MEDICINE CLINIC | Facility: CLINIC | Age: 54
End: 2024-08-23

## 2024-08-23 ENCOUNTER — OFFICE VISIT (OUTPATIENT)
Dept: FAMILY MEDICINE CLINIC | Facility: CLINIC | Age: 54
End: 2024-08-23
Payer: COMMERCIAL

## 2024-08-23 VITALS
BODY MASS INDEX: 32.14 KG/M2 | OXYGEN SATURATION: 97 % | TEMPERATURE: 97 F | HEART RATE: 78 BPM | WEIGHT: 217 LBS | HEIGHT: 69 IN | DIASTOLIC BLOOD PRESSURE: 72 MMHG | SYSTOLIC BLOOD PRESSURE: 112 MMHG

## 2024-08-23 DIAGNOSIS — Z71.84 TRAVEL ADVICE ENCOUNTER: ICD-10-CM

## 2024-08-23 DIAGNOSIS — R42 VERTIGO: ICD-10-CM

## 2024-08-23 PROCEDURE — 3074F SYST BP LT 130 MM HG: CPT | Performed by: STUDENT IN AN ORGANIZED HEALTH CARE EDUCATION/TRAINING PROGRAM

## 2024-08-23 PROCEDURE — 3078F DIAST BP <80 MM HG: CPT | Performed by: STUDENT IN AN ORGANIZED HEALTH CARE EDUCATION/TRAINING PROGRAM

## 2024-08-23 PROCEDURE — 99214 OFFICE O/P EST MOD 30 MIN: CPT | Performed by: STUDENT IN AN ORGANIZED HEALTH CARE EDUCATION/TRAINING PROGRAM

## 2024-08-23 PROCEDURE — 3008F BODY MASS INDEX DOCD: CPT | Performed by: STUDENT IN AN ORGANIZED HEALTH CARE EDUCATION/TRAINING PROGRAM

## 2024-08-23 RX ORDER — MECLIZINE HCL 12.5 MG 12.5 MG/1
TABLET ORAL
Qty: 30 TABLET | Refills: 0 | Status: SHIPPED | OUTPATIENT
Start: 2024-08-23

## 2024-08-23 RX ORDER — FLUTICASONE PROPIONATE 50 MCG
2 SPRAY, SUSPENSION (ML) NASAL DAILY
Qty: 16 G | Refills: 0 | Status: SHIPPED | OUTPATIENT
Start: 2024-08-23

## 2024-08-23 RX ORDER — DIMENHYDRINATE 50 MG
25 TABLET ORAL NIGHTLY PRN
COMMUNITY

## 2024-08-23 RX ORDER — SCOLOPAMINE TRANSDERMAL SYSTEM 1 MG/1
1 PATCH, EXTENDED RELEASE TRANSDERMAL
Qty: 10 PATCH | Refills: 0 | Status: SHIPPED | OUTPATIENT
Start: 2024-08-23 | End: 2024-09-22

## 2024-08-23 NOTE — PROGRESS NOTES
Subjective:   Adriana Hoffmann is a 54 year old female who presents for Dizziness (Vertigo, started mid summer ) and Neck Pain (Had a deep tissue massage, feels tight from injury in April)     54-year-old female complaining of vertigo.  States originally in April she slept wrong and had neck pain.  Went for deep tissue massage and when driving back felt as if she is out of focus with mild dizziness, pulled over and had a granola bar.  Last week had another deep tissue massage and when she had her head turned in a certain position felt immediate nausea and sensation of room spinning.  2 days ago saw her PT.  Vertigo has been constant over the past 6 days however slight and improving at this time.  Started taking Dramamine and Bonine 3 to 4 days ago with some relief.  Feels symptoms to be worse with sudden head movements and position changes from laying down to sitting position  Denies nausea, vomiting    Patient will also be traveling in October for a cruise that will include plains, trains, boats, car rides.  Historically felt motion sickness and would like Rx for medication to help avoid that.      History/Other:    Chief Complaint Reviewed and Verified  Nursing Notes Reviewed and   Verified  Tobacco Reviewed  Allergies Reviewed  Medications Reviewed    Medical History Reviewed  Surgical History Reviewed  OB Status Reviewed    Family History Reviewed  Social History Reviewed         Tobacco:  She has never smoked tobacco.    Current Outpatient Medications   Medication Sig Dispense Refill    dimenhyDRINATE 50 MG Oral Tab Take 0.5 tablets (25 mg total) by mouth nightly as needed.      meclizine 12.5 MG Oral Tab 1-2 tabs tid prn dizziness.  No driving 8 hours after taking this-causes drowsiness. 30 tablet 0    Scopolamine 1.5mg TD patch 1mg/3days Place 1 patch onto the skin every 3 (three) days. Place behind ear at least 4 hours prior to required antiemetic effect for use up to 72 hours. 10 patch 0     LISDEXAMFETAMINE 30 MG Oral Cap TAKE ONE CAPSULE (30mg total) BY MOUTH DAILY 30 capsule 0    escitalopram 10 MG Oral Tab Take 1 tablet (10 mg total) by mouth daily. 90 tablet 1    CUSTOM MEDICATION T4 75 mcg / T3 15  mcg   Take 1 cap in morning daily on empty stomach 90 each 1    Cholecalciferol (VITAMIN D) 50 MCG (2000 UT) Oral Tab Take 1 tablet by mouth daily.      omega-3 fatty acids 1000 MG Oral Cap Take 1,000 mg by mouth daily.      Coenzyme Q10 (COQ-10 OR) Take 1 tablet by mouth daily. 30 capsule 0    MAGNESIUM OR Take by mouth See Admin Instructions. Take 3-5 tablets by mouth nightly.  30 capsule 0    fluticasone propionate 50 MCG/ACT Nasal Suspension 2 sprays by Each Nare route daily. 16 g 0         Review of Systems:  Review of Systems   Constitutional:  Negative for chills, diaphoresis and fever.   HENT:  Negative for congestion, ear discharge, ear pain, sinus pressure, sinus pain and sore throat.    Eyes:  Negative for pain and discharge.   Respiratory:  Negative for cough, chest tightness, shortness of breath and wheezing.    Cardiovascular:  Negative for chest pain and palpitations.   Gastrointestinal:  Negative for abdominal pain, diarrhea, nausea and vomiting.   Endocrine: Negative for cold intolerance and heat intolerance.   Genitourinary:  Negative for dysuria, flank pain, frequency and urgency.   Musculoskeletal:  Negative for joint swelling.   Skin:  Negative for rash.   Neurological:  Negative for dizziness, syncope and headaches.        Sensation of vertigo.   Psychiatric/Behavioral:  Negative for confusion and hallucinations.        Objective:   /72 (BP Location: Left arm, Patient Position: Sitting, Cuff Size: adult)   Pulse 78   Temp 97.1 °F (36.2 °C) (Temporal)   Ht 5' 9\" (1.753 m)   Wt 217 lb (98.4 kg)   LMP 02/07/2019   SpO2 97%   BMI 32.05 kg/m²  Estimated body mass index is 32.05 kg/m² as calculated from the following:    Height as of this encounter: 5' 9\" (1.753 m).     Weight as of this encounter: 217 lb (98.4 kg).  Physical Exam  Constitutional:       General: She is not in acute distress.     Appearance: Normal appearance. She is obese. She is not ill-appearing or toxic-appearing.   HENT:      Head: Normocephalic and atraumatic.   Cardiovascular:      Rate and Rhythm: Normal rate and regular rhythm.      Heart sounds: Normal heart sounds. No murmur heard.     No gallop.   Pulmonary:      Effort: Pulmonary effort is normal. No respiratory distress.      Breath sounds: Normal breath sounds. No stridor. No wheezing, rhonchi or rales.   Abdominal:      General: Bowel sounds are normal.      Palpations: Abdomen is soft.   Musculoskeletal:         General: No swelling.      Cervical back: Normal range of motion and neck supple. No rigidity or tenderness.   Skin:     General: Skin is warm and dry.   Neurological:      General: No focal deficit present.      Mental Status: She is alert and oriented to person, place, and time. Mental status is at baseline.      Cranial Nerves: No cranial nerve deficit.      Sensory: No sensory deficit.      Motor: Motor function is intact. No weakness.      Gait: Gait normal.      Comments: Positive Chon-Hallpike L>R.  Unidirectional nystagmus.   Psychiatric:         Mood and Affect: Mood normal.         Behavior: Behavior normal.         Thought Content: Thought content normal.         Judgment: Judgment normal.         Assessment & Plan:     1. Vertigo  Stop Dramamine.  Can continue Bonine.  Can use below Rx instead of Bonine.  ED precautions.  -     Meclizine HCl; 1-2 tabs tid prn dizziness.  No driving 8 hours after taking this-causes drowsiness.  Dispense: 30 tablet; Refill: 0  2. Travel advice encounter  Patient will be traveling in October for a cruise that will include plains, trains, boats, car rides.  Historically felt motion sickness and would like Rx for medication to help avoid that.  -     Scopolamine; Place 1 patch onto the skin every 3  (three) days. Place behind ear at least 4 hours prior to required antiemetic effect for use up to 72 hours.  Dispense: 10 patch; Refill: 0        Return in about 2 weeks (around 9/6/2024), or if symptoms worsen or fail to improve.    Khanh Castellano MD, 8/23/2024, 11:46 AM       Time spent: 30 minutes, obtaining history, evaluating patient, discussing differential diagnosis, recommendations, diagnostic testing options, treatment options, risks and benefits of my recommendations, counseling patient, discussing prognosis/expectations, and follow up with patient (and/or parent/guardian) as well as completing documentation.

## 2024-08-23 NOTE — TELEPHONE ENCOUNTER
From: Adriana Hoffmann  To: Khanh Castellano  Sent: 8/23/2024 12:26 PM CDT  Subject: Flonase     Hi Dr. Castellano. I just left there. I forgot to ask for a Flonase prescription refill please and thank you Adriana Hoffmann.

## 2024-08-26 ENCOUNTER — HOSPITAL ENCOUNTER (OUTPATIENT)
Dept: MAMMOGRAPHY | Facility: HOSPITAL | Age: 54
Discharge: HOME | End: 2024-08-26
Attending: SURGERY
Payer: COMMERCIAL

## 2024-08-26 DIAGNOSIS — Z91.89 AT HIGH RISK FOR BREAST CANCER: ICD-10-CM

## 2024-08-26 PROCEDURE — 77063 BREAST TOMOSYNTHESIS BI: CPT | Performed by: SURGERY

## 2024-08-26 PROCEDURE — 77067 SCR MAMMO BI INCL CAD: CPT | Performed by: SURGERY

## 2024-09-04 DIAGNOSIS — F41.9 ANXIETY: ICD-10-CM

## 2024-09-04 RX ORDER — ESCITALOPRAM OXALATE 10 MG/1
10 TABLET ORAL DAILY
Qty: 90 TABLET | Refills: 1 | Status: SHIPPED | OUTPATIENT
Start: 2024-09-04 | End: 2025-03-03

## 2024-09-04 NOTE — TELEPHONE ENCOUNTER
A refill request was received for:  Requested Prescriptions     Pending Prescriptions Disp Refills    escitalopram 10 MG Oral Tab 90 tablet 1     Sig: Take 1 tablet (10 mg total) by mouth daily.     Last refill date:  3/12/24   Qty: 90 and 1   Dx: anxiety   Last office visit: 3/12/24    When is follow up due:now         Future Appointments   Date Time Provider Department Center   10/30/2024 10:00 AM Terrance Malcolm MD [id number] [facility]   7/8/2025 10:30 AM Earl Anderson MD [facility]

## 2024-09-20 ENCOUNTER — TELEPHONE (OUTPATIENT)
Dept: FAMILY MEDICINE CLINIC | Facility: CLINIC | Age: 54
End: 2024-09-20

## 2024-09-21 NOTE — TELEPHONE ENCOUNTER
A refill request was received for:  Requested Prescriptions     Pending Prescriptions Disp Refills    fluticasone propionate 50 MCG/ACT Nasal Suspension 16 g 0     Si sprays by Each Nare route daily.     Last refill date:  2024  Qty: 16 g  Dx: Allergic rhinitis   Last office visit: 2024   When is follow up due: please schedule appointment thanks      For hormone prescriptions, date of last blood test for rx being requested:    Future Appointments   Date Time Provider Department Center   10/30/2024 10:00 AM Terrance Malcolm MD [id number]   2025 10:30 AM [facility]   2025 10:30 AM Earl Anderson MD [facility]

## 2024-09-23 RX ORDER — FLUTICASONE PROPIONATE 50 MCG
2 SPRAY, SUSPENSION (ML) NASAL DAILY
Qty: 16 G | Refills: 0 | Status: SHIPPED | OUTPATIENT
Start: 2024-09-23

## 2024-09-23 NOTE — TELEPHONE ENCOUNTER
Pt said that she is leaving out of country this week and cannot schedule appt. She also said she was seen a few weeks ago, wondering why she has to come in now again. Asked if she could get medication before trip

## 2024-10-30 ENCOUNTER — OFFICE VISIT (OUTPATIENT)
Dept: SURGERY | Facility: CLINIC | Age: 54
End: 2024-10-30
Payer: COMMERCIAL

## 2024-10-30 VITALS
HEIGHT: 68.4 IN | WEIGHT: 229.63 LBS | OXYGEN SATURATION: 98 % | BODY MASS INDEX: 34.4 KG/M2 | DIASTOLIC BLOOD PRESSURE: 60 MMHG | SYSTOLIC BLOOD PRESSURE: 120 MMHG | HEART RATE: 78 BPM

## 2024-10-30 DIAGNOSIS — E78.5 DYSLIPIDEMIA: ICD-10-CM

## 2024-10-30 DIAGNOSIS — F43.9 STRESS: ICD-10-CM

## 2024-10-30 DIAGNOSIS — E66.9 OBESITY (BMI 30-39.9): ICD-10-CM

## 2024-10-30 DIAGNOSIS — Z51.81 ENCOUNTER FOR THERAPEUTIC DRUG MONITORING: ICD-10-CM

## 2024-10-30 DIAGNOSIS — R63.2 BINGE EATING: Primary | ICD-10-CM

## 2024-10-30 PROCEDURE — 99214 OFFICE O/P EST MOD 30 MIN: CPT | Performed by: INTERNAL MEDICINE

## 2024-10-30 PROCEDURE — 3074F SYST BP LT 130 MM HG: CPT | Performed by: INTERNAL MEDICINE

## 2024-10-30 PROCEDURE — 3078F DIAST BP <80 MM HG: CPT | Performed by: INTERNAL MEDICINE

## 2024-10-30 PROCEDURE — 3008F BODY MASS INDEX DOCD: CPT | Performed by: INTERNAL MEDICINE

## 2024-10-30 RX ORDER — LISDEXAMFETAMINE DIMESYLATE 50 MG/1
50 CAPSULE ORAL DAILY
Qty: 30 CAPSULE | Refills: 0 | Status: SHIPPED | OUTPATIENT
Start: 2024-12-31 | End: 2025-01-30

## 2024-10-30 RX ORDER — LISDEXAMFETAMINE DIMESYLATE 50 MG/1
50 CAPSULE ORAL DAILY
Qty: 30 CAPSULE | Refills: 0 | Status: SHIPPED | OUTPATIENT
Start: 2024-10-30 | End: 2024-11-29

## 2024-10-30 RX ORDER — LISDEXAMFETAMINE DIMESYLATE 50 MG/1
50 CAPSULE ORAL DAILY
Qty: 30 CAPSULE | Refills: 0 | Status: SHIPPED | OUTPATIENT
Start: 2024-11-30 | End: 2024-12-30

## 2024-10-30 NOTE — PATIENT INSTRUCTIONS
[facility]  [address]  [place] [address]    Phone  Phone: [phone]    Fax  Fax: [phone]    Hours  Pharmacy: Mon - Fri 7:30AM - 7:30PM    Call Center: Mon - Fri 7:00AM - 7:00PM
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
## 2024-12-17 ENCOUNTER — OFFICE VISIT (OUTPATIENT)
Dept: FAMILY MEDICINE CLINIC | Facility: CLINIC | Age: 54
End: 2024-12-17
Payer: COMMERCIAL

## 2024-12-17 VITALS
SYSTOLIC BLOOD PRESSURE: 130 MMHG | TEMPERATURE: 98 F | HEIGHT: 68.75 IN | BODY MASS INDEX: 33.33 KG/M2 | HEART RATE: 94 BPM | OXYGEN SATURATION: 96 % | WEIGHT: 225 LBS | DIASTOLIC BLOOD PRESSURE: 78 MMHG

## 2024-12-17 DIAGNOSIS — E78.00 HYPERCHOLESTEROLEMIA: ICD-10-CM

## 2024-12-17 DIAGNOSIS — R63.2 BINGE EATING: ICD-10-CM

## 2024-12-17 DIAGNOSIS — E21.5 PARATHYROID ABNORMALITY (HCC): ICD-10-CM

## 2024-12-17 DIAGNOSIS — Z80.3 FAMILY HISTORY OF BREAST CANCER IN MOTHER: ICD-10-CM

## 2024-12-17 DIAGNOSIS — Z80.0 FAMILY HISTORY OF COLON CANCER: ICD-10-CM

## 2024-12-17 DIAGNOSIS — Z00.00 ROUTINE MEDICAL EXAM: Primary | ICD-10-CM

## 2024-12-17 DIAGNOSIS — E06.3 HYPOTHYROIDISM DUE TO HASHIMOTO THYROIDITIS: ICD-10-CM

## 2024-12-17 DIAGNOSIS — E04.1 THYROID NODULE: ICD-10-CM

## 2024-12-17 DIAGNOSIS — E66.811 OBESITY, CLASS I, BMI 30-34.9: ICD-10-CM

## 2024-12-17 DIAGNOSIS — F41.9 ANXIETY: ICD-10-CM

## 2024-12-17 DIAGNOSIS — Z71.85 IMMUNIZATION COUNSELING: ICD-10-CM

## 2024-12-17 PROBLEM — E66.3 OVERWEIGHT (BMI 25.0-29.9): Status: RESOLVED | Noted: 2021-07-08 | Resolved: 2024-12-17

## 2024-12-17 PROBLEM — J02.9 PHARYNGITIS: Status: RESOLVED | Noted: 2022-11-21 | Resolved: 2024-12-17

## 2024-12-17 PROCEDURE — 3008F BODY MASS INDEX DOCD: CPT | Performed by: STUDENT IN AN ORGANIZED HEALTH CARE EDUCATION/TRAINING PROGRAM

## 2024-12-17 PROCEDURE — 3075F SYST BP GE 130 - 139MM HG: CPT | Performed by: STUDENT IN AN ORGANIZED HEALTH CARE EDUCATION/TRAINING PROGRAM

## 2024-12-17 PROCEDURE — 3078F DIAST BP <80 MM HG: CPT | Performed by: STUDENT IN AN ORGANIZED HEALTH CARE EDUCATION/TRAINING PROGRAM

## 2024-12-17 PROCEDURE — 99396 PREV VISIT EST AGE 40-64: CPT | Performed by: STUDENT IN AN ORGANIZED HEALTH CARE EDUCATION/TRAINING PROGRAM

## 2024-12-17 NOTE — PROGRESS NOTES
Subjective:   Adriana Hoffmann is a 54 year old female who presents for Physical (No pap)     54-year-old female coming in for routine physical.  Patient has no acute complaints.  Currently following up with weight management clinic Dr. Malcolm and is on semaglutide and Vyvanse.  Following up with integrative medicine for anxiety and is on Lexapro 10 mg daily.  Feeling well on it.  No SHIP.  Follows up with endocrinology for thyroid nodule and parathyroid abnormality.  Mentions a history of atypical ductal hyperplasia status post lumpectomy in 2019.  Following up with breast surgery and has a breast MRI scheduled in January.  Some stressors at home.  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 Mother with history of breast and colon cancer  Family history of heart disease.    History/Other:    Chief Complaint Reviewed and Verified  Nursing Notes Reviewed and   Verified  Tobacco Reviewed  Allergies Reviewed  Medications Reviewed    Problem List Reviewed  Medical History Reviewed  Surgical History   Reviewed  OB Status Reviewed  Family History Reviewed  Social History   Reviewed         Tobacco:  She has never smoked tobacco.    Current Outpatient Medications   Medication Sig Dispense Refill    CUSTOM MEDICATION Semaglutide/ cyanocobalamin    0.25 mg-   concentration: 1 /0.5 mg/ ML  Amount:  1 Ml vial  Instructions: inject 25 units/ 0.25 ML q weekly 1 each 5    lisdexamfetamine (VYVANSE) 50 MG Oral Cap Take 1 capsule (50 mg total) by mouth daily. 30 capsule 0    [START ON 12/31/2024] lisdexamfetamine (VYVANSE) 50 MG Oral Cap Take 1 capsule (50 mg total) by mouth daily. 30 capsule 0    fluticasone propionate 50 MCG/ACT Nasal Suspension 2 sprays by Each Nare route daily. 16 g 0    CUSTOM MEDICATION T4 75 mcg / T3 15  mcg   Take 1 cap in morning daily on empty stomach 90 each 1    escitalopram 10 MG Oral Tab Take 1 tablet (10 mg total) by mouth daily. 90 tablet 1    Cholecalciferol (VITAMIN D) 50 MCG (2000 UT) Oral  Tab Take 1 tablet by mouth daily.      omega-3 fatty acids 1000 MG Oral Cap Take 1,000 mg by mouth daily.      Coenzyme Q10 (COQ-10 OR) Take 1 tablet by mouth daily. 30 capsule 0    MAGNESIUM OR Take by mouth See Admin Instructions. Take 3-5 tablets by mouth nightly.  30 capsule 0    dimenhyDRINATE 50 MG Oral Tab Take 0.5 tablets (25 mg total) by mouth nightly as needed. (Patient not taking: Reported on 12/17/2024)      meclizine 12.5 MG Oral Tab 1-2 tabs tid prn dizziness.  No driving 8 hours after taking this-causes drowsiness. (Patient not taking: Reported on 12/17/2024) 30 tablet 0         Review of Systems:  Review of Systems   Constitutional:  Negative for chills, diaphoresis and fever.   HENT:  Negative for congestion, ear discharge, ear pain, sinus pressure, sinus pain and sore throat.    Eyes:  Negative for pain and discharge.   Respiratory:  Negative for cough, chest tightness, shortness of breath and wheezing.    Cardiovascular:  Negative for chest pain and palpitations.   Gastrointestinal:  Negative for abdominal pain, diarrhea, nausea and vomiting.   Endocrine: Negative for cold intolerance and heat intolerance.   Genitourinary:  Negative for dysuria, flank pain, frequency and urgency.   Musculoskeletal:  Negative for joint swelling.   Skin:  Negative for rash.   Neurological:  Negative for dizziness, syncope and headaches.   Psychiatric/Behavioral:  Negative for confusion and hallucinations.        Objective:   /78 (BP Location: Left arm, Patient Position: Sitting, Cuff Size: adult)   Pulse 94   Temp 98.4 °F (36.9 °C) (Oral)   Ht 5' 8.75\" (1.746 m)   Wt 225 lb (102.1 kg)   LMP 02/07/2019   SpO2 96%   BMI 33.47 kg/m²  Estimated body mass index is 33.47 kg/m² as calculated from the following:    Height as of this encounter: 5' 8.75\" (1.746 m).    Weight as of this encounter: 225 lb (102.1 kg).  Physical Exam  Constitutional:       General: She is not in acute distress.     Appearance: Normal  appearance. She is obese. She is not ill-appearing or toxic-appearing.   HENT:      Head: Normocephalic and atraumatic.      Right Ear: Tympanic membrane and ear canal normal.      Left Ear: Tympanic membrane and ear canal normal.      Mouth/Throat:      Mouth: Mucous membranes are moist.      Pharynx: Oropharynx is clear. No oropharyngeal exudate or posterior oropharyngeal erythema.   Eyes:      Extraocular Movements: Extraocular movements intact.      Pupils: Pupils are equal, round, and reactive to light.   Cardiovascular:      Rate and Rhythm: Normal rate and regular rhythm.      Heart sounds: Normal heart sounds. No murmur heard.     No gallop.   Pulmonary:      Effort: Pulmonary effort is normal. No respiratory distress.      Breath sounds: Normal breath sounds. No stridor. No wheezing, rhonchi or rales.   Abdominal:      General: Bowel sounds are normal.      Palpations: Abdomen is soft.      Tenderness: There is no abdominal tenderness. There is no right CVA tenderness, left CVA tenderness or guarding.   Musculoskeletal:         General: No swelling.      Cervical back: Normal range of motion and neck supple. No rigidity or tenderness.      Right lower leg: No edema.      Left lower leg: No edema.   Skin:     General: Skin is warm and dry.   Neurological:      General: No focal deficit present.      Mental Status: She is alert and oriented to person, place, and time. Mental status is at baseline.      Cranial Nerves: No cranial nerve deficit.      Sensory: No sensory deficit.      Motor: Motor function is intact. No weakness.      Gait: Gait normal.   Psychiatric:         Mood and Affect: Mood normal.         Behavior: Behavior normal.         Thought Content: Thought content normal.         Judgment: Judgment normal.         Assessment & Plan:   1. Routine medical exam (Primary)  -Healthy diet and lifestyle.  -Weight loss.  -Exercise as tolerated.  -Dental exam every 6 months or as recommended by  dentist.  -Eye exams annually, at least every 2 years or as recommended by specialist.  -     CBC, Platelet; No Differential; Future; Expected date: 12/17/2024  -     Comp Metabolic Panel (14); Future; Expected date: 12/17/2024  -     Hemoglobin A1C; Future; Expected date: 12/17/2024  -     Lipid Panel; Future; Expected date: 12/17/2024  -     Assay, Thyroid Stim Hormone; Future; Expected date: 12/17/2024  -     Free T4, (Free Thyroxine); Future; Expected date: 12/17/2024  -     Free T3 (Triiodothryronine); Future; Expected date: 12/17/2024  2. Hypercholesterolemia  The patient's ASCVD 10 year risk score is 1.8.  2/7/2022 CT calcium score with a total calcium score of 0.  -     Lipid Panel; Future; Expected date: 12/17/2024  3. Binge eating   -Continue follow-up with weight management clinic  4. Hypothyroidism due to Hashimoto thyroiditis  -Continue follow-up with endocrinology  -     Assay, Thyroid Stim Hormone; Future; Expected date: 12/17/2024  -     Free T4, (Free Thyroxine); Future; Expected date: 12/17/2024  -     Free T3 (Triiodothryronine); Future; Expected date: 12/17/2024  5. Thyroid nodule  -Continue follow-up with endocrinology  -     Assay, Thyroid Stim Hormone; Future; Expected date: 12/17/2024  -     Free T4, (Free Thyroxine); Future; Expected date: 12/17/2024  -     Free T3 (Triiodothryronine); Future; Expected date: 12/17/2024  6. Parathyroid abnormality (HCC)  -Continue follow-up with endocrinology  7. Anxiety  On Lexapro through integrative medicine.  Stable, no SHIP. CPM.  8. Obesity, Class I, BMI 30-34.9  Medical management through bariatric clinic.  -Continue follow-up with weight management clinic  -     Hemoglobin A1C; Future; Expected date: 12/17/2024  -     Lipid Panel; Future; Expected date: 12/17/2024  9. Family history of breast cancer in mother  -Continue follow-up with breast specialist.  -Breast MRI as scheduled  10. Family history of colon cancer  Needs to reestablish care with  GI.  -     GASTRO - INTERNAL  11. Immunization counseling  Discussed pending immunizations with patient.  Declines influenza and shingles vaccination at this time.        Return in about 6 months (around 6/17/2025).    Khanh Castellano MD, 12/17/2024, 10:12 AM

## 2024-12-26 ENCOUNTER — LAB ENCOUNTER (OUTPATIENT)
Dept: LAB | Facility: HOSPITAL | Age: 54
End: 2024-12-26
Attending: STUDENT IN AN ORGANIZED HEALTH CARE EDUCATION/TRAINING PROGRAM
Payer: COMMERCIAL

## 2024-12-26 DIAGNOSIS — E66.811 OBESITY, CLASS I, BMI 30-34.9: ICD-10-CM

## 2024-12-26 DIAGNOSIS — Z00.00 ROUTINE MEDICAL EXAM: ICD-10-CM

## 2024-12-26 DIAGNOSIS — E78.00 HYPERCHOLESTEROLEMIA: ICD-10-CM

## 2024-12-26 DIAGNOSIS — E06.3 HYPOTHYROIDISM DUE TO HASHIMOTO THYROIDITIS: ICD-10-CM

## 2024-12-26 DIAGNOSIS — E04.1 THYROID NODULE: ICD-10-CM

## 2024-12-26 LAB
ALBUMIN SERPL-MCNC: 4.8 G/DL (ref 3.2–4.8)
ALBUMIN/GLOB SERPL: 1.9 {RATIO} (ref 1–2)
ALP LIVER SERPL-CCNC: 84 U/L
ALT SERPL-CCNC: 24 U/L
ANION GAP SERPL CALC-SCNC: 9 MMOL/L (ref 0–18)
AST SERPL-CCNC: 33 U/L (ref ?–34)
BILIRUB SERPL-MCNC: 0.6 MG/DL (ref 0.3–1.2)
BUN BLD-MCNC: 15 MG/DL (ref 9–23)
BUN/CREAT SERPL: 17.6 (ref 10–20)
CALCIUM BLD-MCNC: 9.7 MG/DL (ref 8.7–10.4)
CHLORIDE SERPL-SCNC: 107 MMOL/L (ref 98–112)
CHOLEST SERPL-MCNC: 224 MG/DL (ref ?–200)
CO2 SERPL-SCNC: 25 MMOL/L (ref 21–32)
CREAT BLD-MCNC: 0.85 MG/DL
DEPRECATED RDW RBC AUTO: 42.6 FL (ref 35.1–46.3)
EGFRCR SERPLBLD CKD-EPI 2021: 82 ML/MIN/1.73M2 (ref 60–?)
ERYTHROCYTE [DISTWIDTH] IN BLOOD BY AUTOMATED COUNT: 13.1 % (ref 11–15)
EST. AVERAGE GLUCOSE BLD GHB EST-MCNC: 108 MG/DL (ref 68–126)
FASTING PATIENT LIPID ANSWER: YES
FASTING STATUS PATIENT QL REPORTED: YES
GLOBULIN PLAS-MCNC: 2.5 G/DL (ref 2–3.5)
GLUCOSE BLD-MCNC: 94 MG/DL (ref 70–99)
HBA1C MFR BLD: 5.4 % (ref ?–5.7)
HCT VFR BLD AUTO: 42.6 %
HDLC SERPL-MCNC: 55 MG/DL (ref 40–59)
HGB BLD-MCNC: 14.3 G/DL
LDLC SERPL CALC-MCNC: 151 MG/DL (ref ?–100)
MCH RBC QN AUTO: 30 PG (ref 26–34)
MCHC RBC AUTO-ENTMCNC: 33.6 G/DL (ref 31–37)
MCV RBC AUTO: 89.3 FL
NONHDLC SERPL-MCNC: 169 MG/DL (ref ?–130)
OSMOLALITY SERPL CALC.SUM OF ELEC: 293 MOSM/KG (ref 275–295)
PLATELET # BLD AUTO: 302 10(3)UL (ref 150–450)
POTASSIUM SERPL-SCNC: 4.3 MMOL/L (ref 3.5–5.1)
PROT SERPL-MCNC: 7.3 G/DL (ref 5.7–8.2)
RBC # BLD AUTO: 4.77 X10(6)UL
SODIUM SERPL-SCNC: 141 MMOL/L (ref 136–145)
T3FREE SERPL-MCNC: 2.92 PG/ML (ref 2.4–4.2)
T4 FREE SERPL-MCNC: 0.8 NG/DL (ref 0.8–1.7)
TRIGL SERPL-MCNC: 101 MG/DL (ref 30–149)
TSI SER-ACNC: 1.03 UIU/ML (ref 0.55–4.78)
VLDLC SERPL CALC-MCNC: 19 MG/DL (ref 0–30)
WBC # BLD AUTO: 6.8 X10(3) UL (ref 4–11)

## 2024-12-26 PROCEDURE — 80050 GENERAL HEALTH PANEL: CPT | Performed by: STUDENT IN AN ORGANIZED HEALTH CARE EDUCATION/TRAINING PROGRAM

## 2024-12-26 PROCEDURE — 83036 HEMOGLOBIN GLYCOSYLATED A1C: CPT | Performed by: STUDENT IN AN ORGANIZED HEALTH CARE EDUCATION/TRAINING PROGRAM

## 2024-12-26 PROCEDURE — 84439 ASSAY OF FREE THYROXINE: CPT | Performed by: STUDENT IN AN ORGANIZED HEALTH CARE EDUCATION/TRAINING PROGRAM

## 2024-12-26 PROCEDURE — 84481 FREE ASSAY (FT-3): CPT | Performed by: STUDENT IN AN ORGANIZED HEALTH CARE EDUCATION/TRAINING PROGRAM

## 2024-12-26 PROCEDURE — 80061 LIPID PANEL: CPT | Performed by: STUDENT IN AN ORGANIZED HEALTH CARE EDUCATION/TRAINING PROGRAM

## 2025-01-07 DIAGNOSIS — E66.9 OBESITY (BMI 30-39.9): ICD-10-CM

## 2025-01-07 DIAGNOSIS — E66.9 OBESITY (BMI 30-39.9): Primary | ICD-10-CM

## 2025-02-13 ENCOUNTER — PATIENT MESSAGE (OUTPATIENT)
Dept: INTEGRATIVE MEDICINE | Facility: CLINIC | Age: 55
End: 2025-02-13

## 2025-02-13 DIAGNOSIS — R63.2 BINGE EATING: Primary | ICD-10-CM

## 2025-02-13 DIAGNOSIS — F41.9 ANXIETY: ICD-10-CM

## 2025-02-13 RX ORDER — LISDEXAMFETAMINE DIMESYLATE 50 MG/1
50 CAPSULE ORAL DAILY
Qty: 30 CAPSULE | Refills: 0 | Status: SHIPPED | OUTPATIENT
Start: 2025-03-16 | End: 2025-04-15

## 2025-02-13 RX ORDER — LISDEXAMFETAMINE DIMESYLATE 50 MG/1
50 CAPSULE ORAL DAILY
Qty: 30 CAPSULE | Refills: 0 | Status: SHIPPED | OUTPATIENT
Start: 2025-02-13 | End: 2025-03-15

## 2025-02-13 RX ORDER — ESCITALOPRAM OXALATE 10 MG/1
10 TABLET ORAL DAILY
Qty: 90 TABLET | Refills: 0 | Status: SHIPPED | OUTPATIENT
Start: 2025-02-13 | End: 2025-05-14

## 2025-03-04 ENCOUNTER — HOSPITAL ENCOUNTER (OUTPATIENT)
Dept: MRI IMAGING | Facility: HOSPITAL | Age: 55
Discharge: HOME | End: 2025-03-04
Attending: SURGERY
Payer: COMMERCIAL

## 2025-03-04 DIAGNOSIS — Z91.89 AT HIGH RISK FOR BREAST CANCER: ICD-10-CM

## 2025-03-04 PROCEDURE — A9575 INJ GADOTERATE MEGLUMI 0.1ML: HCPCS | Performed by: SURGERY

## 2025-03-04 PROCEDURE — 77049 MRI BREAST C-+ W/CAD BI: CPT | Performed by: SURGERY

## 2025-03-04 RX ORDER — GADOTERATE MEGLUMINE 376.9 MG/ML
20 INJECTION INTRAVENOUS
Status: COMPLETED | OUTPATIENT
Start: 2025-03-04 | End: 2025-03-04

## 2025-03-04 RX ADMIN — GADOTERATE MEGLUMINE 20 ML: 376.9 INJECTION INTRAVENOUS at 18:25:00

## 2025-03-05 DIAGNOSIS — Z91.89 AT HIGH RISK FOR BREAST CANCER: Primary | ICD-10-CM

## 2025-03-05 DIAGNOSIS — Z12.31 ENCOUNTER FOR SCREENING MAMMOGRAM FOR MALIGNANT NEOPLASM OF BREAST: ICD-10-CM

## 2025-03-11 ENCOUNTER — TELEPHONE (OUTPATIENT)
Dept: INTEGRATIVE MEDICINE | Facility: CLINIC | Age: 55
End: 2025-03-11

## 2025-03-11 NOTE — TELEPHONE ENCOUNTER
A refill request was received for:  Requested Prescriptions     Pending Prescriptions Disp Refills    CUSTOM MEDICATION 90 each 1     Sig: T4 75 mcg / T3 15  mcg   Take 1 cap in morning daily on empty stomach     Last refill date:  9/5/2024  Qty: 90 each and 1  Dx:   Last office visit: 1/7/2025  When is follow up due: May or June      Future Appointments   Date Time Provider Department Center   5/8/2025 11:15 AM [id number]   7/8/2025 10:30 AM Earl Anderson MD [id number]   8/27/2025 11:40 AM [facility]  [facility]   11/12/2025  1:30 PM Radha Zimmerman PA-C [facility] INT MED [facility]

## 2025-04-22 DIAGNOSIS — R63.2 BINGE EATING: ICD-10-CM

## 2025-04-22 RX ORDER — LISDEXAMFETAMINE DIMESYLATE 50 MG/1
50 CAPSULE ORAL DAILY
Qty: 30 CAPSULE | Refills: 0 | Status: SHIPPED | OUTPATIENT
Start: 2025-04-22 | End: 2025-05-22

## 2025-05-08 ENCOUNTER — OFFICE VISIT (OUTPATIENT)
Dept: SURGERY | Facility: CLINIC | Age: 55
End: 2025-05-08
Payer: COMMERCIAL

## 2025-05-08 VITALS
HEIGHT: 68.4 IN | HEART RATE: 86 BPM | BODY MASS INDEX: 33.11 KG/M2 | RESPIRATION RATE: 16 BRPM | OXYGEN SATURATION: 98 % | SYSTOLIC BLOOD PRESSURE: 90 MMHG | DIASTOLIC BLOOD PRESSURE: 62 MMHG | WEIGHT: 221 LBS

## 2025-05-08 DIAGNOSIS — R63.2 BINGE EATING: Primary | ICD-10-CM

## 2025-05-08 DIAGNOSIS — E66.9 OBESITY (BMI 30-39.9): ICD-10-CM

## 2025-05-08 DIAGNOSIS — Z51.81 ENCOUNTER FOR THERAPEUTIC DRUG MONITORING: ICD-10-CM

## 2025-05-08 DIAGNOSIS — F43.9 STRESS: ICD-10-CM

## 2025-05-08 DIAGNOSIS — E78.5 DYSLIPIDEMIA: ICD-10-CM

## 2025-05-08 DIAGNOSIS — F41.9 ANXIETY: ICD-10-CM

## 2025-05-08 PROCEDURE — 3078F DIAST BP <80 MM HG: CPT | Performed by: INTERNAL MEDICINE

## 2025-05-08 PROCEDURE — 3008F BODY MASS INDEX DOCD: CPT | Performed by: INTERNAL MEDICINE

## 2025-05-08 PROCEDURE — 99214 OFFICE O/P EST MOD 30 MIN: CPT | Performed by: INTERNAL MEDICINE

## 2025-05-08 PROCEDURE — 3074F SYST BP LT 130 MM HG: CPT | Performed by: INTERNAL MEDICINE

## 2025-05-08 RX ORDER — LISDEXAMFETAMINE DIMESYLATE 50 MG/1
50 CAPSULE ORAL DAILY
Qty: 30 CAPSULE | Refills: 0 | Status: SHIPPED | OUTPATIENT
Start: 2025-06-22 | End: 2025-07-22

## 2025-05-08 RX ORDER — LISDEXAMFETAMINE DIMESYLATE 50 MG/1
50 CAPSULE ORAL DAILY
Qty: 30 CAPSULE | Refills: 0 | Status: SHIPPED | OUTPATIENT
Start: 2025-07-22 | End: 2025-08-21

## 2025-05-08 RX ORDER — LISDEXAMFETAMINE DIMESYLATE 50 MG/1
50 CAPSULE ORAL DAILY
Qty: 30 CAPSULE | Refills: 0 | Status: SHIPPED | OUTPATIENT
Start: 2025-05-21 | End: 2025-06-20

## 2025-05-08 RX ORDER — ESCITALOPRAM OXALATE 10 MG/1
10 TABLET ORAL DAILY
Qty: 90 TABLET | Refills: 3 | Status: SHIPPED | OUTPATIENT
Start: 2025-05-08 | End: 2025-08-06

## 2025-05-08 NOTE — PROGRESS NOTES
[facility], [facility], [place]  [address] [place] [id number]  Dept: [phone]           Patient:  Adriana Hoffmann  :      2/3/1970  MRN:      [id number]    Chief Complaint:    Chief Complaint   Patient presents with    Follow - Up    Weight Management       SUBJECTIVE     History of Present Illness:  Adriana is being seen today for a follow-up for non surgical weight loss    Past Medical History:   Past Medical History:    Abdominal pain    Anxiety    Arthritis    BACK PAIN    chronic/episodic lumbar pain since teens    Back pain    Bloating    Body piercing    Chronic low back pain    Constipation    Cyst of right breast    Deep vein thrombosis (HCC)    18 years old from kick to leg    Disorder of thyroid    Dry eyes    Easy bruising    Esophageal reflux    Fatigue    Hashimotos dx    Fibroids, intramural    5.3 cm fibroid    Flatulence/gas pain/belching    Food intolerance    GERD    Heart murmur    Heartburn    High cholesterol    History of benign eye tumor    left    History of cardiac murmur    Hyperlipidemia    Hypothyroidism    Indigestion    Irregular bowel habits    Malleolar fracture    Nabothian cyst    Night sweats    Menopause    Ovarian cyst, right    7 mm    Pain in joints    Pain with bowel movements    Since Hashimotos dx    PONV (postoperative nausea and vomiting)    Problems with swallowing    Right hip pain    18y/o    Sleep disturbance    Spinal stenosis    Stress    Vertigo    Visual impairment    glasses for distance    Wears glasses    Weight loss    Purposely        Comorbidities:  Hyperlipidemia-Improvement?  yes    OBJECTIVE     Vitals: BP 90/62   Pulse 86   Resp 16   Ht 5' 8.4\" (1.737 m)   Wt 221 lb (100.2 kg)   LMP 2019   SpO2 98%   BMI 33.21 kg/m²     Initial weight loss: -08   Total weight loss: +43   Start weight: 177    Wt Readings from Last 3 Encounters:   25 221 lb (100.2 kg)   24  225 lb (102.1 kg)   10/30/24 229 lb 9.6 oz (104.1 kg)       Patient Medications:    Current Outpatient Medications   Medication Sig Dispense Refill    lisdexamfetamine (VYVANSE) 50 MG Oral Cap Take 1 capsule (50 mg total) by mouth daily. 30 capsule 0    CUSTOM MEDICATION T4 75 mcg / T3 15  mcg   Take 1 cap in morning daily on empty stomach 90 each 1    CUSTOM MEDICATION Semaglutide/ cyanocobalamin    0.5 mg-   Concentration: 1/0.5 mg/ML   Amount: 2.5 ML vial  Instructions: Inject 50 units/ 0.5 ML q weekly 1 each 5    escitalopram 10 MG Oral Tab Take 1 tablet (10 mg total) by mouth daily for 90 doses. 90 tablet 0    fluticasone propionate 50 MCG/ACT Nasal Suspension 2 sprays by Each Nare route daily. 16 g 0    dimenhyDRINATE 50 MG Oral Tab Take 0.5 tablets (25 mg total) by mouth nightly as needed. (Patient not taking: Reported on 12/17/2024)      meclizine 12.5 MG Oral Tab 1-2 tabs tid prn dizziness.  No driving 8 hours after taking this-causes drowsiness. (Patient not taking: Reported on 12/17/2024) 30 tablet 0    Cholecalciferol (VITAMIN D) 50 MCG (2000 UT) Oral Tab Take 1 tablet by mouth daily.      omega-3 fatty acids 1000 MG Oral Cap Take 1,000 mg by mouth daily.      Coenzyme Q10 (COQ-10 OR) Take 1 tablet by mouth daily. 30 capsule 0    MAGNESIUM OR Take by mouth See Admin Instructions. Take 3-5 tablets by mouth nightly.  30 capsule 0     Allergies:  Azithromycin and Codeine     Social History:    Social History     Socioeconomic History    Marital status:      Spouse name: Not on file    Number of children: Not on file    Years of education: Not on file    Highest education level: Not on file   Occupational History    Occupation: occupational therapist     Comment: [employer]- [employer]   Tobacco Use    Smoking status: Never     Passive exposure: Never    Smokeless tobacco: Never   Vaping Use    Vaping status: Never Used   Substance and Sexual Activity    Alcohol use: No    Drug use: No    Sexual  activity: Yes     Partners: Male     Birth control/protection: Hysterectomy   Other Topics Concern     Service Not Asked    Blood Transfusions Not Asked    Caffeine Concern No    Occupational Exposure Not Asked    Hobby Hazards Not Asked    Sleep Concern Not Asked    Stress Concern No     Comment: Anxiety    Weight Concern No     Comment: Struggle with it    Special Diet No     Comment: GF and organic 75%    Back Care Not Asked    Exercise No    Bike Helmet Not Asked    Seat Belt No    Self-Exams Not Asked   Social History Narrative    Not on file     Social Drivers of Health     Food Insecurity: Not on file   Transportation Needs: Not on file   Stress: Not on file   Housing Stability: Not on file     Surgical History:    Past Surgical History:   Procedure Laterality Date    Ankle fracture surgery Right 2014    ligament damage    Colonoscopy  2021    Egd      Endometrial ablation      Hysterectomy  2019    Re: uterine fibroid    Roland biopsy stereo nodule 1 site left (cpt=19081)      BENIGN    Roland biopsy stereo nodule 1 site right (cpt=19081)      Roland biopsy stereo nodule 2 site bilat (cpt=19081/43726) Left     benign    Roland biopsy stereo w/calc 1 site left (cpt=19081)      Benign    Roland localization wire 1 site right (cpt=19281)  2019    ADH    Oophorectomy      Other surgical history      septoplasty    Other surgical history N/A 2016    Procedure: HYSTEROSCOPY ENDOMETRIAL ABLATION;  Surgeon: Amelia Cui MD;  Location:  MAIN OR    Total abdom hysterectomy  2019    Total     Family History:    Family History   Problem Relation Age of Onset    Diabetes Father     Heart Disorder Father         CABG    Prostate Cancer Father     Hypertension Father     Lipids Father     Heart Surgery Father          on table while having TAVR     Dementia Father     Other (Other) Father     Other (Prostate Cancer) Father 60        prostate ca    Breast Cancer Mother 60     Diabetes Mother     Heart Disorder Mother         CABG    Lipids Mother     Hypertension Mother     Colon Cancer Mother 82    Dementia Mother     Depression Mother     Obesity Mother     Psychiatric Mother     Heart Disorder Maternal Grandmother     Uterine Cancer Maternal Grandmother     Depression Maternal Grandmother     Other (Bladder Cancer) Maternal Grandmother     Heart Disorder Maternal Grandfather     Other (Other) Maternal Grandfather     Other (chf) Maternal Grandfather     Heart Disorder Paternal Grandmother     Heart Attack Brother          maker MI    Heart Disorder Brother         Death 59 heart attack    Other (Other) Brother         ETOH    Other (alcoholic) Brother     Other (gout) Brother     Other (prostritis) Brother     Other (alcoholism) Brother     Other (thyroid) Sister         poss. huntingtons    Other (irregular HR) Sister     Cancer Other     Breast Cancer Maternal Aunt         Great Aunt       Food Journal  Reviewed and Discussed:       Patient has a Food Journal?: yes   Patient is reading nutrition labels?  yes  Average Caloric Intake:     Average CHO Intake: 100  Is patient exercising? yes  Type of exercise?     Eating Habits  Patient states the following:  Eats 5-6 meal(s) per day  Length of time it takes to consume a meal:  20  # of snacks per day: 1 Type of snacks:  nuts  Amount of soda consumption per day:    Amount of water (in ounces) per day:  64  Drinking between meals only:  yes  Toughest challenge:  Food/binge    Nutritional Goals  Limit carbohydrates to 100 gms per day, Eat 100-200 calories within 1 hour of waking  and Eat 3-4 cups of fresh fruits or vegetables daily    Behavior Modifications Reviewed and Discussed  Eat breakfast, Eat 3 meals per day, Plan meals in advance, Read nutrition labels, Drink 64 oz of water per day, Maintain a daily food journal, No drinking 30 minutes before or after meals, Utlize portion control strategies to reduce  calorie intake, Identify triggers for eating and manage cues and Eat slowly and take 20 to 30 minutes to complete each meal    Exercise Goals Reviewed and Discussed    Continue seeing      ROS:    Constitutional: negative  Respiratory: negative  Cardiovascular: negative  Gastrointestinal: negative  Musculoskeletal:negative  Neurological: negative  Behavioral/Psych: negative  Endocrine: negative  All other systems were reviewed and are negative    Physical Exam:     General appearance: alert, appears stated age and cooperative  Head: Normocephalic, without obvious abnormality, atraumatic  Lungs: clear to auscultation bilaterally  Heart: S1, S2 normal, no murmur, click, rub or gallop, regular rate and rhythm  Abdomen: soft, non-tender; bowel sounds normal; no masses,  no organomegaly  Extremities: extremities normal, atraumatic, no cyanosis or edema  Skin: Skin color, texture, turgor normal. No rashes or lesions  Neurologic: Grossly normal    ASSESSMENT     HYPERCHOLESTEROLEMIA:  The patient states that her cholesterol has been well controlled on her current medication.    Lab Results   Component Value Date/Time    CHOLEST 224 (H) 12/26/2024 07:56 AM     (H) 12/26/2024 07:56 AM    HDL 55 12/26/2024 07:56 AM    TRIG 101 12/26/2024 07:56 AM    VLDL 19 12/26/2024 07:56 AM    TCHDLRATIO 3.47 06/17/2017 06:35 AM       Encounter Diagnosis(ses):   Encounter Diagnoses   Name Primary?    Binge eating Yes    Dyslipidemia     Stress     Encounter for therapeutic drug monitoring     Obesity (BMI 30-39.9)        PLAN     Patient is not interested in bariatric surgery. Patient desires to pursue traditional weight loss at this time.      DYSLIPIDEMIA: Stable on the above prescribed meal plan and medication. Liver function stable.    Lab Results   Component Value Date/Time    CHOLEST 224 (H) 12/26/2024 07:56 AM     (H) 12/26/2024 07:56 AM    HDL 55 12/26/2024 07:56 AM    TRIG 101 12/26/2024 07:56 AM    VLDL 19  12/26/2024 07:56 AM    TCHDLRATIO 3.47 06/17/2017 06:35 AM       Binge eating: improved with Vyvanse    Goals for next month:  1. Keep a food log.  2. Drink 48-64 ounces of non-caloric beverages per day. No fruit juices or regular soda.  3. Increase activity-upper body exercises, walk 10 minutes per day.  4. Increase fruit and vegetable servings to 5-6 per day.      Binge eating: Vyvanse helping  ekg done    Has met with integrative medicine  met with endo team      Compound semaglutide   Increase dose      Tolerating Lexapro 10 mg  Continue with psych team      Diagnoses and all orders for this visit:    Binge eating    Dyslipidemia    Stress    Encounter for therapeutic drug monitoring    Obesity (BMI 30-39.9)          Terrance Malcolm MD

## 2025-07-08 ENCOUNTER — OFFICE VISIT (OUTPATIENT)
Dept: SURGERY | Facility: CLINIC | Age: 55
End: 2025-07-08
Payer: COMMERCIAL

## 2025-07-08 DIAGNOSIS — R92.333 HETEROGENEOUSLY DENSE TISSUE OF BOTH BREASTS ON MAMMOGRAPHY: ICD-10-CM

## 2025-07-08 DIAGNOSIS — Z91.89 AT HIGH RISK FOR BREAST CANCER: Primary | ICD-10-CM

## 2025-07-08 PROCEDURE — 99213 OFFICE O/P EST LOW 20 MIN: CPT | Performed by: SURGERY

## 2025-07-08 NOTE — PROGRESS NOTES
Breast Surgery follow-up    Adriana Hoffmann is a 55 year old patient, referred by Dr. Castellano, who presents for follow up for R breast ADH.    History of Present Illness:   Ms. Adriana Hoffmann is a 55 year old woman with a past history significant for right breast ADH.    She underwent right breast lumpectomy on December 10, 2019 for ADH by Dr. Renae. Final pathology showed ADH. She declined tamoxifen therapy in January 2020. She has been undergoing high risk surveillance (alternating mammograms and breast MRI).     Her last screening mammogram on 8/26/2024 showed density C breast tissue, otherwise benign.  She underwent MRI screening on 3/4/2025 which was negative.      She denies any palpable masses, nipple discharge, skin changes, or axillary adenopathy. She does not have breast pain. She has a history of benign bilateral breast biopsies. She does have family history of breast cancer. Her mother had breast cancer at age 60 and is still alive at age 86. Her mother has also been diagnosed with colon cancer. Her great aunt had breast cancer as well. She does not have a history of genetic testing.    Past medical history, surgical history, family history, allergies, and social history were updated as applicable in EPIC, otherwise see prior consultation note.  Review of Systems:    Review of Systems   Constitutional:  Negative for activity change, appetite change, chills, fatigue and unexpected weight change.   HENT:  Negative for ear pain, hearing loss, nosebleeds, sore throat, trouble swallowing and voice change.    Eyes:  Negative for pain and visual disturbance.   Respiratory:  Negative for cough, chest tightness and shortness of breath.    Cardiovascular:  Negative for chest pain, palpitations and leg swelling.   Gastrointestinal:  Negative for nausea, vomiting, abdominal pain, diarrhea, constipation and blood in stool.   Endocrine: Negative for cold intolerance and heat intolerance.   Genitourinary:   Negative for dysuria, hematuria and difficulty urinating.   Musculoskeletal:  Negative for back pain, joint swelling, joint pain and neck pain.   Skin:  Negative for color change, rash and wound.   Allergic/Immunologic: Negative for environmental allergies.   Neurological:  Negative for tremors, syncope, facial asymmetry, speech difficulty and weakness.   Hematological:  Negative for adenopathy. Does not bruise/bleed easily.   Psychiatric/Behavioral:  Negative for hallucinations, behavioral problems, confusion, agitation and depressed mood.       Otherwise as per HPI.    Physical Exam:    [facility] 02/07/2019     Physical Exam  Vitals reviewed.   Constitutional:       Appearance: Normal appearance.   HENT:      Head: Normocephalic and atraumatic.   Eyes:      Extraocular Movements: Extraocular movements intact.      Pupils: Pupils are equal, round, and reactive to light.   Cardiovascular:      Rate and Rhythm: Normal rate.   Pulmonary:      Effort: Pulmonary effort is normal.   Chest:   Breasts:     Right: Normal. No mass, nipple discharge, skin change or tenderness.      Left: Normal. No mass, nipple discharge, skin change or tenderness.          Comments: Well healed lumpectomy incision  Abdominal:      General: Abdomen is flat.      Palpations: Abdomen is soft.   Musculoskeletal:         General: Normal range of motion.      Cervical back: Normal range of motion and neck supple.   Lymphadenopathy:      Upper Body:      Right upper body: No supraclavicular or axillary adenopathy.      Left upper body: No supraclavicular or axillary adenopathy.   Skin:     General: Skin is warm and dry.   Neurological:      General: No focal deficit present.      Mental Status: She is alert and oriented to person, place, and time.   Psychiatric:         Mood and Affect: Mood normal.          Labs/imaging: reviewed in EPIC    Impression:   Ms. Adriana Hoffmann is a 55 year old woman that presents for follow up of right breast  ADH.    Discussion and Plan:  I had a discussion with the Patient regarding her breast exam. On exam today I found no evidence of disease. I personally reviewed her recent imaging and we discussed this.    Genetic counseling: Patient is interested, number will be provided    Further screening:   Mammogram: Next screening mammogram 8/2025 (scheduled)  MRI: Next screening MRI 3/2026 (ordered)     Chemoprophylaxis: patient not interested at this time due to current menopausal symptoms. She had met with med onc in 2019    Return to clinic:   1 year for breast exam  See PCP/OB/GYN at least annually for additional breast exam      She was given ample opportunity for questions and those questions were answered to her satisfaction. She has been encouraged to contact the office with any questions or concerns prior to her next appointment. This encounter lasted a total of 30 minutes, more than 50% of which was dedicated to the discussion of management options.     Earl Anderson MD  Breast Surgical Oncology    CC: Dr. Castellano

## 2025-07-08 NOTE — PATIENT INSTRUCTIONS
Mammogram: Next screening mammogram 8/2025 (scheduled)  MRI: Next screening MRI 3/2026 (ordered)   If interested in genetic testing, please call [phone] to make an appointment with a genetic counselor at your convenience

## 2025-08-25 DIAGNOSIS — R63.2 BINGE EATING: ICD-10-CM

## 2025-08-26 RX ORDER — LISDEXAMFETAMINE DIMESYLATE 50 MG/1
50 CAPSULE ORAL DAILY
Qty: 30 CAPSULE | Refills: 0 | Status: SHIPPED | OUTPATIENT
Start: 2025-08-26 | End: 2025-09-25

## 2025-08-27 ENCOUNTER — HOSPITAL ENCOUNTER (OUTPATIENT)
Dept: MAMMOGRAPHY | Facility: HOSPITAL | Age: 55
Discharge: HOME | End: 2025-08-27
Attending: SURGERY

## 2025-08-27 DIAGNOSIS — Z91.89 AT HIGH RISK FOR BREAST CANCER: ICD-10-CM

## 2025-08-27 PROCEDURE — 77067 SCR MAMMO BI INCL CAD: CPT | Performed by: SURGERY

## 2025-08-27 PROCEDURE — 77063 BREAST TOMOSYNTHESIS BI: CPT | Performed by: SURGERY

## 2025-08-28 ENCOUNTER — HOSPITAL ENCOUNTER (OUTPATIENT)
Dept: ULTRASOUND IMAGING | Age: 55
Discharge: HOME | End: 2025-08-28
Attending: NURSE PRACTITIONER

## 2025-08-28 DIAGNOSIS — E04.1 THYROID NODULE: ICD-10-CM

## 2025-08-28 PROCEDURE — 76536 US EXAM OF HEAD AND NECK: CPT | Performed by: NURSE PRACTITIONER

## 2025-08-28 RX ORDER — FLUTICASONE PROPIONATE 50 MCG
2 SPRAY, SUSPENSION (ML) NASAL DAILY
Qty: 16 G | Refills: 0 | Status: SHIPPED | OUTPATIENT
Start: 2025-08-28

## (undated) DIAGNOSIS — R63.2 BINGE EATING: ICD-10-CM

## (undated) DIAGNOSIS — R93.1 ABNORMAL SCREENING CARDIAC CT: Primary | ICD-10-CM

## (undated) DEVICE — ELECTRO LUBE IS A SINGLE PATIENT USE DEVICE THAT IS INTENDED TO BE USED ON ELECTROSURGICAL ELECTRODES TO REDUCE STICKING.: Brand: KEY SURGICAL ELECTRO LUBE

## (undated) DEVICE — SUTURE MONOCRYL 4-0 PS-2

## (undated) DEVICE — TROCAR: Brand: KII FIOS FIRST ENTRY

## (undated) DEVICE — VCARE MEDIUM, UTERINE MANIPULATOR, VAGINAL-CERVICAL-AHLUWALIA'S-RETRACTOR-ELEVATOR: Brand: VCARE

## (undated) DEVICE — SUTURE SILK 0 FSL

## (undated) DEVICE — AIRSEAL 5 MM ACCESS PORT AND LOW PROFILE OBTURATOR WITH BLADELESS OPTICAL TIP, 120 MM LENGTH: Brand: AIRSEAL

## (undated) DEVICE — 40580 - THE PINK PAD - ADVANCED TRENDELENBURG POSITIONING KIT: Brand: 40580 - THE PINK PAD - ADVANCED TRENDELENBURG POSITIONING KIT

## (undated) DEVICE — KENDALL SCD EXPRESS SLEEVES, KNEE LENGTH, MEDIUM: Brand: KENDALL SCD

## (undated) DEVICE — DERMABOND LIQUID ADHESIVE

## (undated) DEVICE — TRI-LUMEN FILTERED TUBE SET WITH ACTIVATED CHARCOAL FILTER: Brand: AIRSEAL

## (undated) DEVICE — INSUFFLATION NEEDLE TO ESTABLISH PNEUMOPERITONEUM.: Brand: INSUFFLATION NEEDLE

## (undated) DEVICE — VIOLET BRAIDED (POLYGLACTIN 910), SYNTHETIC ABSORBABLE SUTURE: Brand: COATED VICRYL

## (undated) DEVICE — 3M(TM) MICROPORE TAPE DISPENSER 1535-2: Brand: 3M™ MICROPORE™

## (undated) DEVICE — BREAST-HERNIA-PORT CDS-LF: Brand: MEDLINE INDUSTRIES, INC.

## (undated) DEVICE — 2, DISPOSABLE SUCTION/IRRIGATOR WITHOUT DISPOSABLE TIP: Brand: STRYKEFLOW

## (undated) DEVICE — GAMMEX® NON-LATEX PI TEXTURED SIZE 7.5, STERILE POLYISOPRENE POWDER-FREE SURGICAL GLOVE: Brand: GAMMEX

## (undated) DEVICE — SOL  .9 1000ML BTL

## (undated) DEVICE — GYN LAP/ROBOTIC: Brand: MEDLINE INDUSTRIES, INC.

## (undated) DEVICE — SUTURE VICRYL 5-0 PS-5

## (undated) DEVICE — ULNAR NERVE CUSHION

## (undated) DEVICE — SUTURE VICRYL 0

## (undated) DEVICE — BRA SURGICAL ELIZABETH PINK L

## (undated) DEVICE — DV OBTURATOR BLADELESS 8MM

## (undated) DEVICE — TIP COVER ACCESSORY

## (undated) DEVICE — SOL H2O 1000ML BTL

## (undated) DEVICE — COBRA GRASPER: Brand: ENDOWRIST;DAVINCI SI

## (undated) DEVICE — PK INSTRUMENT CORD, G400 GENERATOR: Brand: PK

## (undated) DEVICE — CONT SPEC SURG FAXITRON WEDGE

## (undated) DEVICE — DV KIT ACCESSORY 3-ARM DISP

## (undated) DEVICE — SUTURE VLOC 90 2-0 9\" 2145

## (undated) NOTE — Clinical Note
I had the pleasure of seeing Cary Dance on 6/21/2021. Please see my attached note.     García Hewitt MD FACS  [facility]Surgery

## (undated) NOTE — Clinical Note
I had the pleasure of seeing Martell Manley on 12/23/2019. Please see my attached note. Stella Pisano MD [facility]--Surgery

## (undated) NOTE — Clinical Note
I had the pleasure of seeing Jackie Ny on 12/3/2019. Please see my attached note. Rajiv Shabazz MD [facility]--Surgery

## (undated) NOTE — Clinical Note
Thank you for the consult. I saw  Ms. Jayde Valencia in the endocrine/diabetes clinic today. Please see attached my note. Please feel free to contact me with any questions. Thanks!

## (undated) NOTE — Clinical Note
I had the pleasure of seeing Keisha Jaimes on 11/20/2019. Please see my attached note. Renny Street MD [facility]--Surgery

## (undated) NOTE — Clinical Note
Thank you for allowing me to care for your patient! I will follow her for high risk and have ordered her upcoming imaging (copied you on results). Thanks, Earl Anedrson